# Patient Record
Sex: MALE | Race: WHITE | Employment: OTHER | URBAN - METROPOLITAN AREA
[De-identification: names, ages, dates, MRNs, and addresses within clinical notes are randomized per-mention and may not be internally consistent; named-entity substitution may affect disease eponyms.]

---

## 2017-07-20 ENCOUNTER — OFFICE VISIT (OUTPATIENT)
Dept: FAMILY MEDICINE CLINIC | Facility: CLINIC | Age: 62
End: 2017-07-20

## 2017-07-20 VITALS
BODY MASS INDEX: 27 KG/M2 | DIASTOLIC BLOOD PRESSURE: 85 MMHG | HEART RATE: 90 BPM | SYSTOLIC BLOOD PRESSURE: 132 MMHG | WEIGHT: 195.38 LBS

## 2017-07-20 DIAGNOSIS — L30.9 ECZEMA, UNSPECIFIED TYPE: Primary | ICD-10-CM

## 2017-07-20 PROCEDURE — 99212 OFFICE O/P EST SF 10 MIN: CPT | Performed by: FAMILY MEDICINE

## 2017-07-20 PROCEDURE — 99213 OFFICE O/P EST LOW 20 MIN: CPT | Performed by: FAMILY MEDICINE

## 2017-07-20 RX ORDER — MUPIROCIN CALCIUM 20 MG/G
1 CREAM TOPICAL 2 TIMES DAILY
Qty: 15 G | Refills: 1 | Status: SHIPPED | OUTPATIENT
Start: 2017-07-20 | End: 2017-08-03

## 2017-07-20 RX ORDER — BETAMETHASONE DIPROPIONATE 0.5 MG/G
CREAM TOPICAL
Qty: 45 G | Refills: 0 | Status: SHIPPED | OUTPATIENT
Start: 2017-07-20 | End: 2017-08-28

## 2017-07-20 NOTE — PROGRESS NOTES
Tara Galindo is a 60year old male. Patient presents with:  Derm Problem    HPI:   Has dryness in  Both hands and cracking skin. Chronic issue and last year became MRSA and had abscess.  Reports using eucerin cream.     No current outpatient prescript plan.      Tasia Chapin MD  7/20/2017  10:49 AM

## 2017-07-20 NOTE — PATIENT INSTRUCTIONS
1. Eczema, unspecified type  Apply bactroban twice a day for 1 week - use non latex gloves and twice a day apply Aquaphor. Then apply betamethasone twice a day and start once a week bleach soaks.

## 2017-08-28 ENCOUNTER — OFFICE VISIT (OUTPATIENT)
Dept: FAMILY MEDICINE CLINIC | Facility: CLINIC | Age: 62
End: 2017-08-28

## 2017-08-28 VITALS
HEIGHT: 70 IN | SYSTOLIC BLOOD PRESSURE: 122 MMHG | BODY MASS INDEX: 27.2 KG/M2 | DIASTOLIC BLOOD PRESSURE: 82 MMHG | WEIGHT: 190 LBS | HEART RATE: 83 BPM

## 2017-08-28 DIAGNOSIS — F17.200 TOBACCO USE DISORDER: ICD-10-CM

## 2017-08-28 DIAGNOSIS — Z00.00 ROUTINE GENERAL MEDICAL EXAMINATION AT A HEALTH CARE FACILITY: Primary | ICD-10-CM

## 2017-08-28 DIAGNOSIS — M54.50 CHRONIC BILATERAL LOW BACK PAIN WITHOUT SCIATICA: ICD-10-CM

## 2017-08-28 DIAGNOSIS — L25.9 CONTACT DERMATITIS AND ECZEMA: ICD-10-CM

## 2017-08-28 DIAGNOSIS — Z85.038 HISTORY OF COLON CANCER: ICD-10-CM

## 2017-08-28 DIAGNOSIS — G89.29 CHRONIC BILATERAL LOW BACK PAIN WITHOUT SCIATICA: ICD-10-CM

## 2017-08-28 PROBLEM — Z87.01 HISTORY OF PNEUMONIA: Status: ACTIVE | Noted: 2017-08-28

## 2017-08-28 PROCEDURE — 99396 PREV VISIT EST AGE 40-64: CPT | Performed by: PHYSICIAN ASSISTANT

## 2017-08-28 RX ORDER — NICOTINE 21 MG/24HR
1 PATCH, TRANSDERMAL 24 HOURS TRANSDERMAL EVERY 24 HOURS
Qty: 21 PATCH | Refills: 2 | Status: SHIPPED | OUTPATIENT
Start: 2017-08-28 | End: 2018-06-27

## 2017-08-28 RX ORDER — BETAMETHASONE DIPROPIONATE 0.5 MG/G
CREAM TOPICAL
Qty: 45 G | Refills: 0 | Status: SHIPPED | OUTPATIENT
Start: 2017-08-28 | End: 2019-07-30

## 2017-08-28 NOTE — PROGRESS NOTES
HPI:   Lena Rahman is a 60year old male who presents for physical exam.  He has history of colon cancer diagnosed 1998 and has been getting surveillance colonoscopy every 3 years. He has history of pneumonia and COPD. He is chronic smoker.   He de Last attempt to quit: 8/31/2015    Smokeless tobacco: Never Used                        Comment: E-cig rarely    Alcohol use: Yes           6.0 oz/week       Cans of beer: 12 per week       Comment: weekly    Drug use: No            Other Topics groomed. Physical Exam:  GEN:  Patient is alert, awake and oriented, well developed, well nourished, no apparent distress. HEENT:  Head:  Normocephalic, atraumatic.  Eyes: EOMI, PERRLA, no scleral icterus, conjunctivae clear bilaterally, no eye discharg

## 2017-08-29 ENCOUNTER — LAB ENCOUNTER (OUTPATIENT)
Dept: LAB | Facility: HOSPITAL | Age: 62
End: 2017-08-29
Attending: PHYSICIAN ASSISTANT
Payer: COMMERCIAL

## 2017-08-29 ENCOUNTER — HOSPITAL ENCOUNTER (OUTPATIENT)
Dept: GENERAL RADIOLOGY | Facility: HOSPITAL | Age: 62
Discharge: HOME | End: 2017-08-29
Attending: PHYSICIAN ASSISTANT
Payer: COMMERCIAL

## 2017-08-29 DIAGNOSIS — Z00.00 ROUTINE GENERAL MEDICAL EXAMINATION AT A HEALTH CARE FACILITY: ICD-10-CM

## 2017-08-29 DIAGNOSIS — M54.50 CHRONIC BILATERAL LOW BACK PAIN WITHOUT SCIATICA: ICD-10-CM

## 2017-08-29 DIAGNOSIS — G89.29 CHRONIC BILATERAL LOW BACK PAIN WITHOUT SCIATICA: ICD-10-CM

## 2017-08-29 LAB
ALBUMIN SERPL BCP-MCNC: 4.2 G/DL (ref 3.5–4.8)
ALBUMIN/GLOB SERPL: 1.5 {RATIO} (ref 1–2)
ALP SERPL-CCNC: 62 U/L (ref 32–100)
ALT SERPL-CCNC: 19 U/L (ref 17–63)
ANION GAP SERPL CALC-SCNC: 7 MMOL/L (ref 0–18)
AST SERPL-CCNC: 29 U/L (ref 15–41)
BASOPHILS # BLD: 0 K/UL (ref 0–0.2)
BASOPHILS NFR BLD: 1 %
BILIRUB SERPL-MCNC: 0.9 MG/DL (ref 0.3–1.2)
BUN SERPL-MCNC: 13 MG/DL (ref 8–20)
BUN/CREAT SERPL: 12.7 (ref 10–20)
CALCIUM SERPL-MCNC: 8.6 MG/DL (ref 8.5–10.5)
CHLORIDE SERPL-SCNC: 105 MMOL/L (ref 95–110)
CHOLEST SERPL-MCNC: 190 MG/DL (ref 110–200)
CO2 SERPL-SCNC: 24 MMOL/L (ref 22–32)
CREAT SERPL-MCNC: 1.02 MG/DL (ref 0.5–1.5)
EOSINOPHIL # BLD: 0.2 K/UL (ref 0–0.7)
EOSINOPHIL NFR BLD: 2 %
ERYTHROCYTE [DISTWIDTH] IN BLOOD BY AUTOMATED COUNT: 12.8 % (ref 11–15)
GLOBULIN PLAS-MCNC: 2.8 G/DL (ref 2.5–3.7)
GLUCOSE SERPL-MCNC: 105 MG/DL (ref 70–99)
HCT VFR BLD AUTO: 45.7 % (ref 41–52)
HDLC SERPL-MCNC: 49 MG/DL
HGB BLD-MCNC: 15.8 G/DL (ref 13.5–17.5)
LDLC SERPL CALC-MCNC: 120 MG/DL (ref 0–99)
LYMPHOCYTES # BLD: 1.8 K/UL (ref 1–4)
LYMPHOCYTES NFR BLD: 25 %
MCH RBC QN AUTO: 32.7 PG (ref 27–32)
MCHC RBC AUTO-ENTMCNC: 34.6 G/DL (ref 32–37)
MCV RBC AUTO: 94.6 FL (ref 80–100)
MONOCYTES # BLD: 0.7 K/UL (ref 0–1)
MONOCYTES NFR BLD: 10 %
NEUTROPHILS # BLD AUTO: 4.7 K/UL (ref 1.8–7.7)
NEUTROPHILS NFR BLD: 63 %
NONHDLC SERPL-MCNC: 141 MG/DL
OSMOLALITY UR CALC.SUM OF ELEC: 282 MOSM/KG (ref 275–295)
PLATELET # BLD AUTO: 192 K/UL (ref 140–400)
PMV BLD AUTO: 8.9 FL (ref 7.4–10.3)
POTASSIUM SERPL-SCNC: 4.2 MMOL/L (ref 3.3–5.1)
PROT SERPL-MCNC: 7 G/DL (ref 5.9–8.4)
PSA SERPL-MCNC: 0.9 NG/ML (ref 0–4)
RBC # BLD AUTO: 4.83 M/UL (ref 4.5–5.9)
SODIUM SERPL-SCNC: 136 MMOL/L (ref 136–144)
TRIGL SERPL-MCNC: 103 MG/DL (ref 1–149)
TSH SERPL-ACNC: 2.46 UIU/ML (ref 0.45–5.33)
WBC # BLD AUTO: 7.4 K/UL (ref 4–11)

## 2017-08-29 PROCEDURE — 85025 COMPLETE CBC W/AUTO DIFF WBC: CPT

## 2017-08-29 PROCEDURE — 80061 LIPID PANEL: CPT

## 2017-08-29 PROCEDURE — 84443 ASSAY THYROID STIM HORMONE: CPT

## 2017-08-29 PROCEDURE — 36415 COLL VENOUS BLD VENIPUNCTURE: CPT

## 2017-08-29 PROCEDURE — 72100 X-RAY EXAM L-S SPINE 2/3 VWS: CPT | Performed by: PHYSICIAN ASSISTANT

## 2017-08-29 PROCEDURE — 80053 COMPREHEN METABOLIC PANEL: CPT

## 2017-09-08 ENCOUNTER — TELEPHONE (OUTPATIENT)
Dept: GASTROENTEROLOGY | Facility: CLINIC | Age: 62
End: 2017-09-08

## 2017-09-08 DIAGNOSIS — Z86.010 HX OF COLONIC POLYPS: Primary | ICD-10-CM

## 2017-09-08 DIAGNOSIS — Z80.0 FAMILY HX OF COLON CANCER: ICD-10-CM

## 2017-09-08 RX ORDER — IBUPROFEN 200 MG
200 TABLET ORAL EVERY 6 HOURS PRN
COMMUNITY
End: 2019-07-30

## 2017-09-08 NOTE — TELEPHONE ENCOUNTER
Last Procedure:  CLN 12/18/2012  Last Diagnosis: POSTOPERATIVE DIAGNOSES:  1. Diminutive descending colon polyp. 2. Minimal colonic diverticulosis. 3. Normal ileocolonic anastomosis. Pt has hx of colon cancer and strong family history.   Recalled for (y

## 2017-09-08 NOTE — TELEPHONE ENCOUNTER
Pt calling to schedule cln/procedure, pls call at:[phone],thanks. *Pt informed about the 72 hr c/b.

## 2017-09-08 NOTE — TELEPHONE ENCOUNTER
May schedule for a personal history of colon polyps and a family history of colon cancer. IV sedation. Split dose MiraLAX/Gatorade preparation.

## 2017-09-20 NOTE — TELEPHONE ENCOUNTER
Scheduled for:  Colonoscopy - [address]    Provider Name:   Gosia [address]  Date:  12/8/17  Location:  [facility]  Sedation:  IV  Time:  (pt is aware that [facility] will call with arrival time)  Prep:  Miralax/Gatorade, Prep instructions were given to pt over the phone, pt verbaliz

## 2017-12-08 ENCOUNTER — LAB REQUISITION (OUTPATIENT)
Dept: LAB | Facility: HOSPITAL | Age: 62
End: 2017-12-08
Payer: COMMERCIAL

## 2017-12-08 DIAGNOSIS — Z01.89 ENCOUNTER FOR OTHER SPECIFIED SPECIAL EXAMINATIONS: ICD-10-CM

## 2017-12-08 PROCEDURE — 88305 TISSUE EXAM BY PATHOLOGIST: CPT | Performed by: INTERNAL MEDICINE

## 2017-12-13 ENCOUNTER — TELEPHONE (OUTPATIENT)
Dept: GASTROENTEROLOGY | Facility: CLINIC | Age: 62
End: 2017-12-13

## 2017-12-13 NOTE — TELEPHONE ENCOUNTER
----- Message from Carson Salvador MD sent at 12/12/2017  6:25 PM CST -----  I spoke to Raj Lanier. He is feeling well. He had #5 subcentimeter tubular adenomas removed. Minimal diverticulosis was present.   I have recommended a high-fiber diet and a follo

## 2018-06-27 ENCOUNTER — HOSPITAL ENCOUNTER (OUTPATIENT)
Age: 63
Discharge: HOME | End: 2018-06-27
Attending: EMERGENCY MEDICINE
Payer: COMMERCIAL

## 2018-06-27 VITALS
HEIGHT: 71 IN | TEMPERATURE: 98 F | RESPIRATION RATE: 20 BRPM | SYSTOLIC BLOOD PRESSURE: 135 MMHG | BODY MASS INDEX: 26.6 KG/M2 | WEIGHT: 190 LBS | DIASTOLIC BLOOD PRESSURE: 89 MMHG

## 2018-06-27 DIAGNOSIS — L30.9 ECZEMA, UNSPECIFIED TYPE: Primary | ICD-10-CM

## 2018-06-27 PROCEDURE — 99213 OFFICE O/P EST LOW 20 MIN: CPT

## 2018-06-27 PROCEDURE — 99214 OFFICE O/P EST MOD 30 MIN: CPT

## 2018-06-27 RX ORDER — SULFAMETHOXAZOLE AND TRIMETHOPRIM 800; 160 MG/1; MG/1
1 TABLET ORAL 2 TIMES DAILY
Qty: 14 TABLET | Refills: 0 | Status: SHIPPED | OUTPATIENT
Start: 2018-06-27 | End: 2018-07-04

## 2018-06-27 NOTE — ED PROVIDER NOTES
Patient Seen in: [facility]mediate Care In [address] [place] [address]    History   Patient presents with:  Rash Skin Problem (integumentary)    Stated Complaint: rash    HPI    Patient is a 69-year-old male that has an eczematous dry skin and itchy rash to his r (36.9 °C) (Oral)   Resp 20   Ht 180.3 cm (5' 11\")   Wt 86.2 kg   BMI 26.50 kg/m²         Physical Exam    Patient is afebrile and in no distress focused exam in both his hands    Patient is an eczematous pruritic rash on the volar surface of his right wri

## 2018-06-29 ENCOUNTER — OFFICE VISIT (OUTPATIENT)
Dept: FAMILY MEDICINE CLINIC | Facility: CLINIC | Age: 63
End: 2018-06-29

## 2018-06-29 VITALS
BODY MASS INDEX: 28.35 KG/M2 | HEART RATE: 69 BPM | HEIGHT: 70 IN | DIASTOLIC BLOOD PRESSURE: 83 MMHG | TEMPERATURE: 98 F | SYSTOLIC BLOOD PRESSURE: 127 MMHG | WEIGHT: 198 LBS

## 2018-06-29 DIAGNOSIS — L30.9 ECZEMA OF BOTH HANDS: Primary | ICD-10-CM

## 2018-06-29 DIAGNOSIS — M79.89 BILATERAL HAND SWELLING: ICD-10-CM

## 2018-06-29 PROCEDURE — 99212 OFFICE O/P EST SF 10 MIN: CPT | Performed by: FAMILY MEDICINE

## 2018-06-29 PROCEDURE — 1111F DSCHRG MED/CURRENT MED MERGE: CPT | Performed by: FAMILY MEDICINE

## 2018-06-29 PROCEDURE — 99214 OFFICE O/P EST MOD 30 MIN: CPT | Performed by: FAMILY MEDICINE

## 2018-06-29 RX ORDER — PREDNISONE 20 MG/1
TABLET ORAL
Qty: 10 TABLET | Refills: 0 | Status: SHIPPED | OUTPATIENT
Start: 2018-06-29 | End: 2019-07-30

## 2018-06-29 NOTE — PROGRESS NOTES
Patient ID: Amy Mirza is a 58year old male.     HPI  Patient presents with:  Hospital F/U: UC - Rash on bilateral hands    ED Provider Notes  Date of Service: 6/27/2018 1:14 PM  Nichole Norwood MD   Emergency Medicine      []Manual[]Template  []Copied 06/29/18 : 127/83  06/27/18 : 135/89  08/28/17 : 122/82  07/20/17 : 132/85  07/15/16 : 124/79  07/05/16 : 137/79        Review of Systems      Past Medical History:   Diagnosis Date   • Cancer [facility])     colon cancer   • Carcinoma in situ of colon    • Hype -     predniSONE 20 MG Oral Tab; Take 2 by mouth at same time daily for 5 days.  (Best taken at [facility] or [facility])  Definitely getting better and will continue the medications per the hospital but also start prednisone for 5 days for quicker relief and res

## 2018-07-06 ENCOUNTER — TELEPHONE (OUTPATIENT)
Dept: OTHER | Age: 63
End: 2018-07-06

## 2018-07-06 ENCOUNTER — HOSPITAL ENCOUNTER (OUTPATIENT)
Age: 63
Discharge: HOME | End: 2018-07-06
Attending: EMERGENCY MEDICINE
Payer: COMMERCIAL

## 2018-07-06 VITALS
OXYGEN SATURATION: 97 % | RESPIRATION RATE: 18 BRPM | HEART RATE: 82 BPM | TEMPERATURE: 98 F | DIASTOLIC BLOOD PRESSURE: 92 MMHG | BODY MASS INDEX: 28 KG/M2 | WEIGHT: 198 LBS | SYSTOLIC BLOOD PRESSURE: 143 MMHG

## 2018-07-06 DIAGNOSIS — L20.82 FLEXURAL ECZEMA: Primary | ICD-10-CM

## 2018-07-06 PROCEDURE — 99213 OFFICE O/P EST LOW 20 MIN: CPT

## 2018-07-06 PROCEDURE — 99214 OFFICE O/P EST MOD 30 MIN: CPT

## 2018-07-06 RX ORDER — CEPHALEXIN 500 MG/1
500 CAPSULE ORAL 4 TIMES DAILY
Qty: 28 CAPSULE | Refills: 0 | Status: SHIPPED | OUTPATIENT
Start: 2018-07-06 | End: 2018-07-13

## 2018-07-06 NOTE — TELEPHONE ENCOUNTER
Pt seen for dermatitis issue on hands bilateral.  Completed medication therapy but not completely resolved stills has redness, blisters and pus with pain. Concerned may turn into MRSA as has H/O it.   Wanted appt for eval today, however no access w/ any pr

## 2018-07-06 NOTE — ED INITIAL ASSESSMENT (HPI)
10 days ago he was seen for weeping rash on both hands and prescribed bactrim and a cream.  Saw PMD 2 days later. Has been using cotton gloves in addition to creams and hands look much better but still has open areas between some fingers.

## 2018-07-06 NOTE — ED PROVIDER NOTES
Patient Seen in: [facility] Immediate Care In [address] [place] [address]    History   Patient presents with:  Rash Skin Problem (integumentary)    Stated Complaint: recheck fingers    HPI  Patient is a 39-year-old male who presents to immediate care with a rash on Musculoskeletal: Negative. Skin: Positive for rash. Neurological: Negative. Positive for stated complaint: recheck fingers  Other systems are as noted in HPI. Constitutional and vital signs reviewed.       All other systems reviewed and negat List    START taking these medications    cephALEXin (KEFLEX) 500 MG Oral Cap  Take 1 capsule (500 mg total) by mouth 4 (four) times daily.   Qty: 28 capsule Refills: 0

## 2019-07-30 ENCOUNTER — HOSPITAL ENCOUNTER (OUTPATIENT)
Age: 64
Discharge: HOME | End: 2019-07-30
Attending: EMERGENCY MEDICINE
Payer: MEDICARE

## 2019-07-30 VITALS
OXYGEN SATURATION: 98 % | HEART RATE: 83 BPM | SYSTOLIC BLOOD PRESSURE: 138 MMHG | TEMPERATURE: 98 F | BODY MASS INDEX: 26.6 KG/M2 | HEIGHT: 71 IN | WEIGHT: 190 LBS | RESPIRATION RATE: 16 BRPM | DIASTOLIC BLOOD PRESSURE: 77 MMHG

## 2019-07-30 DIAGNOSIS — L03.019 CELLULITIS OF FINGER, UNSPECIFIED LATERALITY: ICD-10-CM

## 2019-07-30 DIAGNOSIS — L20.82 FLEXURAL ECZEMA: Primary | ICD-10-CM

## 2019-07-30 PROCEDURE — 99214 OFFICE O/P EST MOD 30 MIN: CPT

## 2019-07-30 PROCEDURE — 99213 OFFICE O/P EST LOW 20 MIN: CPT

## 2019-07-30 RX ORDER — CLINDAMYCIN HYDROCHLORIDE 300 MG/1
300 CAPSULE ORAL 3 TIMES DAILY
Qty: 30 CAPSULE | Refills: 0 | Status: SHIPPED | OUTPATIENT
Start: 2019-07-30 | End: 2019-08-09

## 2019-07-30 NOTE — ED INITIAL ASSESSMENT (HPI)
Pt complaining of itchiness in both hands. +discharge to hands since he is scratching them.   Hx of mrsa

## 2019-07-30 NOTE — ED PROVIDER NOTES
Patient presents with:  Itchiness    Stated Complaint: Rash    HPI  Patient complains of skin rash for  B/L rash on hands, flexural,  days. Located in between fingers, h/x of exczema and cellulitis.   Describes as red warm and tender, in right hand area, negative except as noted above. PSFH elements reviewed from today and agreed except as otherwise stated in HPI.     Physical Exam     ED Triage Vitals [07/30/19 1458]   /77   Pulse 83   Resp 16   Temp 97.8 °F (36.6 °C)   Temp src Oral   SpO2 98 %

## 2019-07-31 ENCOUNTER — TELEPHONE (OUTPATIENT)
Dept: OTHER | Age: 64
End: 2019-07-31

## 2019-07-31 ENCOUNTER — OFFICE VISIT (OUTPATIENT)
Dept: FAMILY MEDICINE CLINIC | Facility: CLINIC | Age: 64
End: 2019-07-31
Payer: MEDICARE

## 2019-07-31 ENCOUNTER — TELEPHONE (OUTPATIENT)
Dept: FAMILY MEDICINE CLINIC | Facility: CLINIC | Age: 64
End: 2019-07-31

## 2019-07-31 VITALS
HEART RATE: 66 BPM | SYSTOLIC BLOOD PRESSURE: 126 MMHG | BODY MASS INDEX: 28.63 KG/M2 | TEMPERATURE: 97 F | DIASTOLIC BLOOD PRESSURE: 82 MMHG | HEIGHT: 70 IN | WEIGHT: 200 LBS

## 2019-07-31 DIAGNOSIS — L03.119 CELLULITIS AND ABSCESS OF HAND: Primary | ICD-10-CM

## 2019-07-31 DIAGNOSIS — L30.9 ECZEMA OF BOTH HANDS: ICD-10-CM

## 2019-07-31 DIAGNOSIS — L02.519 CELLULITIS AND ABSCESS OF HAND: Primary | ICD-10-CM

## 2019-07-31 PROCEDURE — 96372 THER/PROPH/DIAG INJ SC/IM: CPT | Performed by: NURSE PRACTITIONER

## 2019-07-31 PROCEDURE — G0463 HOSPITAL OUTPT CLINIC VISIT: HCPCS | Performed by: NURSE PRACTITIONER

## 2019-07-31 PROCEDURE — 99213 OFFICE O/P EST LOW 20 MIN: CPT | Performed by: NURSE PRACTITIONER

## 2019-07-31 RX ORDER — CEFTRIAXONE 1 G/1
1000 INJECTION, POWDER, FOR SOLUTION INTRAMUSCULAR; INTRAVENOUS ONCE
Status: COMPLETED | OUTPATIENT
Start: 2019-07-31 | End: 2019-07-31

## 2019-07-31 RX ADMIN — CEFTRIAXONE 1000 MG: 1 INJECTION, POWDER, FOR SOLUTION INTRAMUSCULAR; INTRAVENOUS at 11:59:00

## 2019-07-31 NOTE — PROGRESS NOTES
HPI    Patient presents for urgent care follow up. Was seen yesterday and diagnosed with eczema/cellulitus. Was put on clindamycin to take tid x 10 days as well as triamcinolone cream to use bid. Feels like today is much worse. Swollen and oozing.   Has Worry: Not on file        Inability: Not on file      Transportation needs:        Medical: Not on file        Non-medical: Not on file    Tobacco Use      Smoking status: Current Every Day Smoker        Packs/day: 1.00        Years: 40.00        Pac Clindamycin HCl 300 MG Oral Cap Take 1 capsule (300 mg total) by mouth 3 (three) times daily for 10 days. Disp: 30 capsule Rfl: 0   triamcinolone acetonide 0.1 % External Ointment Apply 1 Application topically 2 (two) times daily.  Disp: 1 Tube Rfl: 0

## 2019-07-31 NOTE — PATIENT INSTRUCTIONS
Discharge Instructions for Cellulitis  You have been diagnosed with cellulitis. This is an infection in the deepest layer of the skin. In some cases, the infection also affects the muscle. Cellulitis is caused by bacteria.  The bacteria can enter the body Date Last Reviewed: 8/1/2016  © 1183-3020 [employer] [address]. [address] [facility], [address] [place]. All rights reserved. This information is not intended as a substitute for professional medical care.  Always follow your healthcare professional'

## 2019-07-31 NOTE — TELEPHONE ENCOUNTER
Pt states he was seen in UC yesterday, diagnosed with eczema of his right hand; also has some areas on left hand. Pt states today his hand is swollen and draining clear to pink drainage. Pt was given prescription for Clindamycin and Triamcinolone oint.  Pt

## 2019-07-31 NOTE — TELEPHONE ENCOUNTER
Pt wife stts that the Dermatology that ([facility]) referral them to Edi Walter  is booked out for the next two month 1st opening is in September, Pt wife wants to know if Dr can contact her office to get him in faster to be looked at.       Please advise

## 2019-08-05 ENCOUNTER — OFFICE VISIT (OUTPATIENT)
Dept: DERMATOLOGY CLINIC | Facility: CLINIC | Age: 64
End: 2019-08-05
Payer: MEDICARE

## 2019-08-05 DIAGNOSIS — L03.119 CELLULITIS AND ABSCESS OF HAND: ICD-10-CM

## 2019-08-05 DIAGNOSIS — L25.9 CONTACT DERMATITIS AND ECZEMA: Primary | ICD-10-CM

## 2019-08-05 DIAGNOSIS — L02.519 CELLULITIS AND ABSCESS OF HAND: ICD-10-CM

## 2019-08-05 PROCEDURE — 99202 OFFICE O/P NEW SF 15 MIN: CPT | Performed by: DERMATOLOGY

## 2019-08-05 PROCEDURE — G0463 HOSPITAL OUTPT CLINIC VISIT: HCPCS | Performed by: DERMATOLOGY

## 2019-08-05 RX ORDER — PREDNISONE 10 MG/1
TABLET ORAL
Qty: 30 TABLET | Refills: 0 | Status: SHIPPED | OUTPATIENT
Start: 2019-08-05 | End: 2019-08-17

## 2019-08-05 RX ORDER — FLUCONAZOLE 100 MG/1
100 TABLET ORAL DAILY
Qty: 5 TABLET | Refills: 0 | Status: SHIPPED | OUTPATIENT
Start: 2019-08-05 | End: 2020-08-25

## 2019-08-07 ENCOUNTER — OFFICE VISIT (OUTPATIENT)
Dept: FAMILY MEDICINE CLINIC | Facility: CLINIC | Age: 64
End: 2019-08-07
Payer: MEDICARE

## 2019-08-07 VITALS
HEART RATE: 71 BPM | RESPIRATION RATE: 20 BRPM | BODY MASS INDEX: 27.44 KG/M2 | TEMPERATURE: 98 F | HEIGHT: 71 IN | DIASTOLIC BLOOD PRESSURE: 88 MMHG | SYSTOLIC BLOOD PRESSURE: 137 MMHG | WEIGHT: 196 LBS

## 2019-08-07 DIAGNOSIS — L03.119 CELLULITIS AND ABSCESS OF HAND: Primary | ICD-10-CM

## 2019-08-07 DIAGNOSIS — L02.519 CELLULITIS AND ABSCESS OF HAND: Primary | ICD-10-CM

## 2019-08-07 PROCEDURE — G0463 HOSPITAL OUTPT CLINIC VISIT: HCPCS | Performed by: NURSE PRACTITIONER

## 2019-08-07 PROCEDURE — 99213 OFFICE O/P EST LOW 20 MIN: CPT | Performed by: NURSE PRACTITIONER

## 2019-08-07 NOTE — PROGRESS NOTES
HPI    Patient presents for one week follow up. Was seen in the IC last week on 7/30 for cellulitis of the hand and given po clindamycin and topical triamcinolone. Followed up in the office 7/31 and reported that it was getting more swollen and painful. Number of children: Not on file      Years of education: Not on file      Highest education level: Not on file    Occupational History      Not on file    Social Needs      Financial resource strain: Not on file      Food insecurity:        Worry: Not Bike Helmet: Not Asked        Seat Belt: Not Asked        Self-Exams: Not Asked        Grew up on a farm: Not Asked        History of tanning: Not Asked        Outdoor occupation: Not Asked        Reaction to local anesthetic: No    Social History Na Discussed plan of care with patient and patient is in agreement. All questions answered. Patient to call with questions or concerns. Encouraged to sign up for My Chart if not already registered.

## 2019-08-07 NOTE — PATIENT INSTRUCTIONS
Discharge Instructions for Cellulitis  You have been diagnosed with cellulitis. This is an infection in the deepest layer of the skin and tissue beneath the skin. In some cases, the infection also affects the muscle. Cellulitis is caused by bacteria.  The · Vomiting  Date Last Reviewed: 8/1/2016  © 4582-0559 [employer]. [address] [facility], [facility] [place]. All rights reserved. This information is not intended as a substitute for professional medical care.  Always follow your healthcare p

## 2019-08-18 NOTE — PROGRESS NOTES
Suyapa Serrano is a 71year old male. Patient presents with:  Derm Problem: New pt. pt presenting today with Abscess to R hand for a week. c/o itching and swelling.  pt went to urgent care and was prescribed  Clindamycin 300 mg and triamcinolone cr situ of colon    • Hypercholesterolemia    • Measles    • MRSA (methicillin resistant Staphylococcus aureus) 12.2009    Per [employer] is Jt Brown, double check with patient   • Mumps    • Varicella zoster      Past Surgical History:   Procedure Laterality Date Not on file        Forced sexual activity: Not on file    Other Topics      Concerns:         Service: Not Asked        Blood Transfusions: Not Asked        Caffeine Concern: Yes          6 cups coffee daily        Occupational Exposure: Not Asked else at home itching. No recent illnesses. ROS:   Denies any other systemic complaints. No fevers, chills, night sweats, photosensitivity, lymph node swelling. No other skin complaints.       Physical examination:  Well-developed well-nourished pat needed. RTC as noted 1 week if needed    The patient indicates understanding of these issues and agrees to the plan. The patient is asked to return as noted in follow-up/ above. This note was generated using Dragon voice recognition software.   Obie

## 2020-03-16 ENCOUNTER — APPOINTMENT (OUTPATIENT)
Dept: LAB | Age: 65
End: 2020-03-16
Attending: FAMILY MEDICINE
Payer: MEDICARE

## 2020-03-16 ENCOUNTER — HOSPITAL ENCOUNTER (OUTPATIENT)
Dept: GENERAL RADIOLOGY | Age: 65
Discharge: HOME | End: 2020-03-16
Attending: FAMILY MEDICINE
Payer: MEDICARE

## 2020-03-16 ENCOUNTER — OFFICE VISIT (OUTPATIENT)
Dept: FAMILY MEDICINE CLINIC | Facility: CLINIC | Age: 65
End: 2020-03-16
Payer: MEDICARE

## 2020-03-16 VITALS
SYSTOLIC BLOOD PRESSURE: 110 MMHG | HEART RATE: 88 BPM | TEMPERATURE: 97 F | WEIGHT: 210.63 LBS | BODY MASS INDEX: 29.49 KG/M2 | DIASTOLIC BLOOD PRESSURE: 70 MMHG | HEIGHT: 71 IN

## 2020-03-16 DIAGNOSIS — J43.2 CENTRILOBULAR EMPHYSEMA (HCC): ICD-10-CM

## 2020-03-16 DIAGNOSIS — J43.9 PULMONARY EMPHYSEMA, UNSPECIFIED EMPHYSEMA TYPE (HCC): ICD-10-CM

## 2020-03-16 DIAGNOSIS — J98.4 PULMONARY SCARRING: ICD-10-CM

## 2020-03-16 DIAGNOSIS — Z00.00 ADULT GENERAL MEDICAL EXAM: Primary | ICD-10-CM

## 2020-03-16 DIAGNOSIS — Z11.4 ENCOUNTER FOR SCREENING FOR HIV: ICD-10-CM

## 2020-03-16 DIAGNOSIS — Z00.00 ENCOUNTER FOR ANNUAL HEALTH EXAMINATION: ICD-10-CM

## 2020-03-16 DIAGNOSIS — F17.200 TOBACCO USE DISORDER: ICD-10-CM

## 2020-03-16 DIAGNOSIS — F17.210 CIGARETTE SMOKER: ICD-10-CM

## 2020-03-16 DIAGNOSIS — Z23 FLU VACCINE NEED: ICD-10-CM

## 2020-03-16 DIAGNOSIS — L30.9 ECZEMA OF BOTH HANDS: ICD-10-CM

## 2020-03-16 DIAGNOSIS — Z85.038 HISTORY OF COLON CANCER: ICD-10-CM

## 2020-03-16 DIAGNOSIS — Z11.59 NEED FOR HEPATITIS C SCREENING TEST: ICD-10-CM

## 2020-03-16 DIAGNOSIS — Z23 NEED FOR VACCINATION: ICD-10-CM

## 2020-03-16 PROCEDURE — 90670 PCV13 VACCINE IM: CPT | Performed by: FAMILY MEDICINE

## 2020-03-16 PROCEDURE — G0008 ADMIN INFLUENZA VIRUS VAC: HCPCS | Performed by: FAMILY MEDICINE

## 2020-03-16 PROCEDURE — 99406 BEHAV CHNG SMOKING 3-10 MIN: CPT | Performed by: FAMILY MEDICINE

## 2020-03-16 PROCEDURE — 71046 X-RAY EXAM CHEST 2 VIEWS: CPT | Performed by: FAMILY MEDICINE

## 2020-03-16 PROCEDURE — G0438 PPPS, INITIAL VISIT: HCPCS | Performed by: FAMILY MEDICINE

## 2020-03-16 PROCEDURE — G0009 ADMIN PNEUMOCOCCAL VACCINE: HCPCS | Performed by: FAMILY MEDICINE

## 2020-03-16 PROCEDURE — 99397 PER PM REEVAL EST PAT 65+ YR: CPT | Performed by: FAMILY MEDICINE

## 2020-03-16 PROCEDURE — 93005 ELECTROCARDIOGRAM TRACING: CPT

## 2020-03-16 PROCEDURE — 96160 PT-FOCUSED HLTH RISK ASSMT: CPT | Performed by: FAMILY MEDICINE

## 2020-03-16 PROCEDURE — 90662 IIV NO PRSV INCREASED AG IM: CPT | Performed by: FAMILY MEDICINE

## 2020-03-16 PROCEDURE — 93010 ELECTROCARDIOGRAM REPORT: CPT | Performed by: FAMILY MEDICINE

## 2020-03-16 NOTE — PROGRESS NOTES
Tobacco Cessation Documentation (Smoking and Smokeless included): I had an in depth therapy session with Marvin Flynn about his tobacco use risks and options using the USPSTF's Five A's approach:    Ask: Merari Espinal is using tobacco products.   Assess:

## 2020-03-16 NOTE — PROGRESS NOTES
HPI:   Lena Rahman is a 76year old male who presents for a Medicare Initial Annual Wellness visit (Once after 12 month Medicare anniversary) . This is his first Medicare physical. He is . He retired 2 years ago.  He and his wife have a carolina risk: Fall/Risk Scorin    Cognitive Assessment   He had a completely normal cognitive assessment- see flowsheet entries    Functional Ability/Status   Liliana Cedillo has some abnormal functions as listed below:  He has problems with Daily Activitie of colon cancer     Chronic bilateral low back pain without sciatica     History of pneumonia     Contact dermatitis and eczema     Cellulitis and abscess of hand     Eczema of both hands    Wt Readings from Last 3 Encounters:  03/16/20 : 210 lb 9.6 oz (95 Negative for voice change. Respiratory: Negative for cough, chest tightness, shortness of breath and wheezing. Cardiovascular: Negative for chest pain. Gastrointestinal: Negative for abdominal pain and blood in stool.    Genitourinary: Negative for I misunderstand what others are saying and make inappropriate responses:  No I avoid social activities because I cannot hear well and fear I will reply improperly:  No   Family members and friends have told me they think I may have hearing loss:   No Older PRSV Free (96078) 03/16/2020   • Pneumococcal (Prevnar 13) 03/16/2020        ASSESSMENT AND OTHER RELEVANT CHRONIC CONDITIONS:   Joe Cervantes is a 76year old male who presents for a Medicare Assessment.      PLAN SUMMARY:     Diagnoses and all Instructions       Jasmeet Edwards's SCREENING SCHEDULE   Tests on this list are recommended by your physician but may not be covered, or covered at this frequency, by your insurer.  Please check with your insurance carrier before scheduling to verify co family history    Colorectal Cancer Screening Covered up to Age 76     Colonoscopy Screen   Covered every 10 years- more often if abnormal Colonoscopy due on 12/08/2020 Update Health Maintenance if applicable    Flex Sigmoidoscopy Screen  Covered every 5 y metal- TD and TDaP Not covered by Medicare Part B) No orders found for this or any previous visit.  This may be covered with your prescription benefits, but Medicare does not cover unless Medically needed    Zoster (Not covered by Medicare Part B) No orders Sugar (FSB)Annually Glucose (mg/dL)   Date Value   08/29/2017 105 (H)     GLUCOSE (P) (mg/dL)   Date Value   10/20/2014 89          Cardiovascular Disease Screening     LDL Annually LDL Cholesterol (mg/dL)   Date Value   08/29/2017 120 (H)   10/20/2014 100 This may be covered with your pharmacy  prescription benefits      SPECIFIC DISEASE MONITORING Internal Lab or Procedure External Lab or Procedure            COPD      Spirometry Testing Annually Spirometry date:  No flowsheet data found.           By Corby Morin

## 2020-03-16 NOTE — PATIENT INSTRUCTIONS
Derian Edwards's SCREENING SCHEDULE   Tests on this list are recommended by your physician but may not be covered, or covered at this frequency, by your insurer. Please check with your insurance carrier before scheduling to verify coverage.     PREVEN Colorectal Cancer Screening Covered up to Age 76     Colonoscopy Screen   Covered every 10 years- more often if abnormal Colonoscopy due on 12/08/2020 Update Health Maintenance if applicable    Flex Sigmoidoscopy Screen  Covered every 5 years No results Not covered by Medicare Part B) No orders found for this or any previous visit.  This may be covered with your prescription benefits, but Medicare does not cover unless Medically needed    Zoster (Not covered by Medicare Part B) No orders found for this or

## 2020-03-18 LAB
ABSOLUTE BASOPHILS: 49 CELLS/UL (ref 0–200)
ABSOLUTE EOSINOPHILS: 170 CELLS/UL (ref 15–500)
ABSOLUTE LYMPHOCYTES: 1434 CELLS/UL (ref 850–3900)
ABSOLUTE MONOCYTES: 705 CELLS/UL (ref 200–950)
ABSOLUTE NEUTROPHILS: 5743 CELLS/UL (ref 1500–7800)
ALBUMIN/GLOBULIN RATIO: 1.9 (CALC) (ref 1–2.5)
ALBUMIN: 4.5 G/DL (ref 3.6–5.1)
ALKALINE PHOSPHATASE: 70 U/L (ref 35–144)
ALT: 26 U/L (ref 9–46)
AST: 23 U/L (ref 10–35)
BASOPHILS: 0.6 %
BILIRUBIN, TOTAL: 0.9 MG/DL (ref 0.2–1.2)
BUN: 10 MG/DL (ref 7–25)
CALCIUM: 9.4 MG/DL (ref 8.6–10.3)
CARBON DIOXIDE: 23 MMOL/L (ref 20–32)
CHLORIDE: 106 MMOL/L (ref 98–110)
CHOL/HDLC RATIO: 3.9 (CALC)
CHOLESTEROL, TOTAL: 197 MG/DL
CREATININE: 0.99 MG/DL (ref 0.7–1.25)
EGFR IF AFRICN AM: 92 ML/MIN/1.73M2
EGFR IF NONAFRICN AM: 79 ML/MIN/1.73M2
EOSINOPHILS: 2.1 %
GLOBULIN: 2.4 G/DL (CALC) (ref 1.9–3.7)
GLUCOSE: 91 MG/DL (ref 65–99)
HDL CHOLESTEROL: 51 MG/DL
HEMATOCRIT: 44.8 % (ref 38.5–50)
HEMOGLOBIN: 15.7 G/DL (ref 13.2–17.1)
LDL-CHOLESTEROL: 120 MG/DL (CALC)
LYMPHOCYTES: 17.7 %
MCH: 32.8 PG (ref 27–33)
MCHC: 35 G/DL (ref 32–36)
MCV: 93.5 FL (ref 80–100)
MONOCYTES: 8.7 %
MPV: 10.7 FL (ref 7.5–12.5)
NEUTROPHILS: 70.9 %
NON-HDL CHOLESTEROL: 146 MG/DL (CALC)
PLATELET COUNT: 211 THOUSAND/UL (ref 140–400)
POTASSIUM: 4.2 MMOL/L (ref 3.5–5.3)
PROTEIN, TOTAL: 6.9 G/DL (ref 6.1–8.1)
PSA, TOTAL: 1.1 NG/ML
RDW: 12.1 % (ref 11–15)
RED BLOOD CELL COUNT: 4.79 MILLION/UL (ref 4.2–5.8)
SIGNAL TO CUT-OFF: 0.01
SODIUM: 140 MMOL/L (ref 135–146)
TRIGLYCERIDES: 150 MG/DL
TSH: 1.58 MIU/L (ref 0.4–4.5)
WHITE BLOOD CELL COUNT: 8.1 THOUSAND/UL (ref 3.8–10.8)

## 2020-05-20 ENCOUNTER — TELEPHONE (OUTPATIENT)
Dept: PULMONOLOGY | Facility: CLINIC | Age: 65
End: 2020-05-20

## 2020-05-20 NOTE — TELEPHONE ENCOUNTER
Pt has an appt tomorrow with Dr Kishor Montano. States he has a cough for about a month.  Please advise

## 2020-05-20 NOTE — TELEPHONE ENCOUNTER
Spoke with patient. Patient states he has an appointment tomorrow, has been having a cough for a month. Travel screening completed. Verified appointment is still scheduled for tomorrow at [facility]. Patient verbalized understanding.

## 2020-05-21 ENCOUNTER — OFFICE VISIT (OUTPATIENT)
Dept: PULMONOLOGY | Facility: CLINIC | Age: 65
End: 2020-05-21
Payer: MEDICARE

## 2020-05-21 VITALS
WEIGHT: 205 LBS | OXYGEN SATURATION: 97 % | BODY MASS INDEX: 28.7 KG/M2 | DIASTOLIC BLOOD PRESSURE: 88 MMHG | RESPIRATION RATE: 18 BRPM | HEIGHT: 71 IN | SYSTOLIC BLOOD PRESSURE: 134 MMHG | HEART RATE: 76 BPM

## 2020-05-21 DIAGNOSIS — R05.9 COUGH: ICD-10-CM

## 2020-05-21 DIAGNOSIS — Z72.0 TOBACCO ABUSE: ICD-10-CM

## 2020-05-21 DIAGNOSIS — R91.1 PULMONARY NODULE: Primary | ICD-10-CM

## 2020-05-21 DIAGNOSIS — R06.00 DOE (DYSPNEA ON EXERTION): ICD-10-CM

## 2020-05-21 PROCEDURE — 3075F SYST BP GE 130 - 139MM HG: CPT | Performed by: INTERNAL MEDICINE

## 2020-05-21 PROCEDURE — 3008F BODY MASS INDEX DOCD: CPT | Performed by: INTERNAL MEDICINE

## 2020-05-21 PROCEDURE — 99204 OFFICE O/P NEW MOD 45 MIN: CPT | Performed by: INTERNAL MEDICINE

## 2020-05-21 PROCEDURE — 3079F DIAST BP 80-89 MM HG: CPT | Performed by: INTERNAL MEDICINE

## 2020-05-21 RX ORDER — ALBUTEROL SULFATE 90 UG/1
2 AEROSOL, METERED RESPIRATORY (INHALATION) 4 TIMES DAILY PRN
Qty: 1 INHALER | Refills: 5 | Status: SHIPPED | OUTPATIENT
Start: 2020-05-21 | End: 2021-09-08

## 2020-05-21 NOTE — PROGRESS NOTES
Pulmonary Consult Note    HPI:   Jan Brenner is a 76year old male with Patient presents with:  Consult: centrilbular emphysema    Arden Mcadams DO    New insurance   F/u for IM visit and CXR done    States feeling ok  C/o arthritis- low back mostl Current Some Day Smoker        Packs/day: 0.50        Years: 40.00        Pack years: 20        Types: Cigarettes        Quit date: 2015        Years since quittin.7      Smokeless tobacco: Never Used      Tobacco comment: E-cig rarely    [employer]

## 2020-06-04 ENCOUNTER — HOSPITAL ENCOUNTER (OUTPATIENT)
Dept: GENERAL RADIOLOGY | Age: 65
Discharge: HOME | End: 2020-06-04
Attending: INTERNAL MEDICINE
Payer: MEDICARE

## 2020-06-04 DIAGNOSIS — Z72.0 TOBACCO ABUSE: ICD-10-CM

## 2020-06-04 DIAGNOSIS — R06.00 DOE (DYSPNEA ON EXERTION): ICD-10-CM

## 2020-06-04 DIAGNOSIS — R91.1 PULMONARY NODULE: ICD-10-CM

## 2020-06-04 DIAGNOSIS — R05.9 COUGH: ICD-10-CM

## 2020-06-04 PROCEDURE — 71048 X-RAY EXAM CHEST 4+ VIEWS: CPT | Performed by: INTERNAL MEDICINE

## 2020-06-19 ENCOUNTER — TELEPHONE (OUTPATIENT)
Dept: PULMONOLOGY | Facility: CLINIC | Age: 65
End: 2020-06-19

## 2020-06-22 NOTE — TELEPHONE ENCOUNTER
Patient Result Comments     Written by Teresa Walsh MD on 6/5/2020  6:56 PM   The \"nodule\" that the radiologist may have seen now looks like some scar tissue. This is good news.      raymond

## 2020-06-22 NOTE — TELEPHONE ENCOUNTER
Pt notified of below re: x-ray chest results from 6/4/20. Explained results including Dr. Nely De Jesus comments are also available via [address] He voiced understanding.

## 2020-06-25 ENCOUNTER — TELEPHONE (OUTPATIENT)
Dept: PEDIATRICS CLINIC | Facility: CLINIC | Age: 65
End: 2020-06-25

## 2020-06-26 NOTE — TELEPHONE ENCOUNTER
LMTCB - patient was supposed to follow up with Dr Ramos in 2 months, that was back in March, Please schedule follow up appointment if patient calls back ,thank you.

## 2020-07-17 ENCOUNTER — OFFICE VISIT (OUTPATIENT)
Dept: FAMILY MEDICINE CLINIC | Facility: CLINIC | Age: 65
End: 2020-07-17
Payer: MEDICARE

## 2020-07-17 VITALS
TEMPERATURE: 98 F | SYSTOLIC BLOOD PRESSURE: 145 MMHG | DIASTOLIC BLOOD PRESSURE: 90 MMHG | HEART RATE: 68 BPM | BODY MASS INDEX: 28.67 KG/M2 | HEIGHT: 71 IN | WEIGHT: 204.81 LBS

## 2020-07-17 DIAGNOSIS — L25.9 CONTACT DERMATITIS AND ECZEMA: Primary | ICD-10-CM

## 2020-07-17 PROCEDURE — 3077F SYST BP >= 140 MM HG: CPT | Performed by: NURSE PRACTITIONER

## 2020-07-17 PROCEDURE — 3080F DIAST BP >= 90 MM HG: CPT | Performed by: NURSE PRACTITIONER

## 2020-07-17 PROCEDURE — 99213 OFFICE O/P EST LOW 20 MIN: CPT | Performed by: NURSE PRACTITIONER

## 2020-07-17 PROCEDURE — 3008F BODY MASS INDEX DOCD: CPT | Performed by: NURSE PRACTITIONER

## 2020-07-17 RX ORDER — HYDROXYZINE HYDROCHLORIDE 25 MG/1
25 TABLET, FILM COATED ORAL EVERY 8 HOURS PRN
Qty: 15 TABLET | Refills: 0 | Status: SHIPPED | OUTPATIENT
Start: 2020-07-17 | End: 2021-04-02

## 2020-07-17 RX ORDER — PREDNISONE 20 MG/1
TABLET ORAL
Qty: 10 TABLET | Refills: 0 | Status: SHIPPED | OUTPATIENT
Start: 2020-07-17 | End: 2020-08-25

## 2020-07-17 RX ORDER — HYDROXYZINE HYDROCHLORIDE 25 MG/1
25 TABLET, FILM COATED ORAL EVERY 8 HOURS PRN
Qty: 15 TABLET | Refills: 0 | Status: SHIPPED | OUTPATIENT
Start: 2020-07-17 | End: 2020-07-17

## 2020-07-17 RX ORDER — PREDNISONE 20 MG/1
TABLET ORAL
Qty: 10 TABLET | Refills: 0 | Status: SHIPPED | OUTPATIENT
Start: 2020-07-17 | End: 2020-07-17

## 2020-07-17 NOTE — PROGRESS NOTES
HPI  Pt presents with bilat hand rash. Was working in garage and since Wed night started having some rash on hands and by last night itching was terrible.    Rash is present to dorsum and monet surface of bilat hands  Has h/o MRSA  Review of Systems   Con Inability: Not on file      Transportation needs:        Medical: Not on file        Non-medical: Not on file    Tobacco Use      Smoking status: Current Some Day Smoker        Packs/day: 0.50        Years: 40.00        Pack years: 20        Types: Cigaret with wife            Work [address]/            Dog/Cat at home      Current Outpatient Medications   Medication Sig Dispense Refill   • predniSONE 20 MG Oral Tab Take 2 tablets once a day for 5 days 10 tablet 0   • hydrOXYzine HCl 25 MG Oral concerns. Encouraged to sign up for My Chart if not already registered.

## 2020-07-17 NOTE — PATIENT INSTRUCTIONS
Contact Dermatitis  Contact dermatitis is a skin rash caused by something that touches the skin and makes it irritated and inflamed. Your skin may be red, swollen, dry, and may be cracked. Blisters may form and ooze. The rash will itch.    Contact dermati · You can also try wet dressings. One way to do this is to wear a wet piece of clothing under a dry one. Wear a damp shirt under a dry shirt if your upper body is affected. This can relieve itching and prevent you from scratching the affected area.   · You © 1673-8218 [employer] [address]. 140[address] [facility], [facility] [place]. All rights reserved. This information is not intended as a substitute for professional medical care. Always follow your healthcare professional's instructions.

## 2020-07-19 NOTE — ASSESSMENT & PLAN NOTE
Skin  discussed  Should wear gloves when working w caustic chemicals  Please call if symptoms worsen or are not resolving.

## 2020-08-25 ENCOUNTER — OFFICE VISIT (OUTPATIENT)
Dept: FAMILY MEDICINE CLINIC | Facility: CLINIC | Age: 65
End: 2020-08-25
Payer: MEDICARE

## 2020-08-25 VITALS
DIASTOLIC BLOOD PRESSURE: 94 MMHG | SYSTOLIC BLOOD PRESSURE: 142 MMHG | BODY MASS INDEX: 28.56 KG/M2 | HEART RATE: 73 BPM | HEIGHT: 71 IN | WEIGHT: 204 LBS

## 2020-08-25 DIAGNOSIS — L30.9 ECZEMA OF BOTH HANDS: Primary | ICD-10-CM

## 2020-08-25 DIAGNOSIS — M19.042 INFLAMMATION OF JOINT OF BOTH HANDS: ICD-10-CM

## 2020-08-25 DIAGNOSIS — M19.041 INFLAMMATION OF JOINT OF BOTH HANDS: ICD-10-CM

## 2020-08-25 PROCEDURE — 3077F SYST BP >= 140 MM HG: CPT | Performed by: NURSE PRACTITIONER

## 2020-08-25 PROCEDURE — 99214 OFFICE O/P EST MOD 30 MIN: CPT | Performed by: NURSE PRACTITIONER

## 2020-08-25 PROCEDURE — 3080F DIAST BP >= 90 MM HG: CPT | Performed by: NURSE PRACTITIONER

## 2020-08-25 PROCEDURE — 3008F BODY MASS INDEX DOCD: CPT | Performed by: NURSE PRACTITIONER

## 2020-08-25 RX ORDER — TRIAMCINOLONE ACETONIDE 5 MG/G
CREAM TOPICAL
Qty: 60 G | Refills: 2 | Status: SHIPPED | OUTPATIENT
Start: 2020-08-25 | End: 2021-04-02

## 2020-08-25 NOTE — PROGRESS NOTES
HPI  Pt here for increase in rash and itching on hands. Flared up again on Saturday. Gets little blisters on hands and then fluid comes out. S/s worse when he is \"tinkering around in his garage\".     Prednisone seemed to help a lot when he took it last mo Occupational History      Not on file    Social Needs      Financial resource strain: Not on file      Food insecurity:        Worry: Not on file        Inability: Not on file      Transportation needs:        Medical: Not on file        Non-medical: Not Grew up on a farm: Not Asked        History of tanning: Not Asked        Outdoor occupation: Not Asked        Reaction to local anesthetic: No    Social History Narrative      Live with wife            Work [address]/            Dog/Cat Discussed plan of care with pt and pt is in agreement. All questions answered. Pt to call with questions or concerns. Encouraged to sign up for My Chart if not already registered.

## 2020-08-28 LAB
C-REACTIVE PROTEIN: 3.6 MG/L
CYCLIC CITRULLINATED$PEPTIDE (CCP) AB (IGG): <16 UNITS
RHEUMATOID FACTOR: <14 IU/ML
SED RATE BY MODIFIED$WESTERGREN: 2 MM/H

## 2020-09-01 ENCOUNTER — TELEPHONE (OUTPATIENT)
Dept: FAMILY MEDICINE CLINIC | Facility: CLINIC | Age: 65
End: 2020-09-01

## 2020-09-01 NOTE — TELEPHONE ENCOUNTER
Patient following up for results, notes below reviewed with understanding. Patient reports rash has been improving with cream, he will hold off on Derm appt at this time, if symptoms worsen will make appt as recommended.      Written by Ricky Loredo,

## 2020-11-02 ENCOUNTER — TELEPHONE (OUTPATIENT)
Dept: GASTROENTEROLOGY | Facility: CLINIC | Age: 65
End: 2020-11-02

## 2020-11-02 NOTE — TELEPHONE ENCOUNTER
----- Message from Eric Reynolds RN sent at 12/13/2017  9:03 AM CST -----  Regarding: recall colon  Recall colon in 3 years per GS.  Colon done 12/8/17

## 2021-02-06 DIAGNOSIS — Z23 NEED FOR VACCINATION: ICD-10-CM

## 2021-03-22 ENCOUNTER — TELEPHONE (OUTPATIENT)
Dept: CASE MANAGEMENT | Age: 66
End: 2021-03-22

## 2021-03-22 NOTE — TELEPHONE ENCOUNTER
Patient is eligible for a 2021 Medicare Annual Wellness visit. Left message to call back [phone].

## 2021-04-02 ENCOUNTER — TELEPHONE (OUTPATIENT)
Dept: GASTROENTEROLOGY | Facility: CLINIC | Age: 66
End: 2021-04-02

## 2021-04-02 ENCOUNTER — HOSPITAL ENCOUNTER (OUTPATIENT)
Dept: GENERAL RADIOLOGY | Age: 66
Discharge: HOME | End: 2021-04-02
Attending: FAMILY MEDICINE
Payer: MEDICARE

## 2021-04-02 ENCOUNTER — OFFICE VISIT (OUTPATIENT)
Dept: FAMILY MEDICINE CLINIC | Facility: CLINIC | Age: 66
End: 2021-04-02
Payer: MEDICARE

## 2021-04-02 VITALS
TEMPERATURE: 98 F | SYSTOLIC BLOOD PRESSURE: 153 MMHG | BODY MASS INDEX: 29.49 KG/M2 | WEIGHT: 210.63 LBS | DIASTOLIC BLOOD PRESSURE: 84 MMHG | HEART RATE: 88 BPM | HEIGHT: 71 IN

## 2021-04-02 DIAGNOSIS — Z00.00 ENCOUNTER FOR ANNUAL HEALTH EXAMINATION: ICD-10-CM

## 2021-04-02 DIAGNOSIS — M47.816 ARTHRITIS, LUMBAR SPINE: ICD-10-CM

## 2021-04-02 DIAGNOSIS — M19.041 INFLAMMATION OF JOINT OF BOTH HANDS: ICD-10-CM

## 2021-04-02 DIAGNOSIS — S22.000D CLOSED COMPRESSION FRACTURE OF THORACIC VERTEBRA WITH ROUTINE HEALING, SUBSEQUENT ENCOUNTER: ICD-10-CM

## 2021-04-02 DIAGNOSIS — Z00.00 ADULT GENERAL MEDICAL EXAM: Primary | ICD-10-CM

## 2021-04-02 DIAGNOSIS — F17.210 CIGARETTE SMOKER: ICD-10-CM

## 2021-04-02 DIAGNOSIS — J43.2 CENTRILOBULAR EMPHYSEMA (HCC): ICD-10-CM

## 2021-04-02 DIAGNOSIS — M19.042 INFLAMMATION OF JOINT OF BOTH HANDS: ICD-10-CM

## 2021-04-02 DIAGNOSIS — E78.2 MIXED HYPERLIPIDEMIA: ICD-10-CM

## 2021-04-02 DIAGNOSIS — Z85.038 HISTORY OF COLON CANCER: ICD-10-CM

## 2021-04-02 DIAGNOSIS — Z86.010 PERSONAL HISTORY OF COLONIC POLYPS: Primary | ICD-10-CM

## 2021-04-02 DIAGNOSIS — I10 ESSENTIAL HYPERTENSION: ICD-10-CM

## 2021-04-02 PROCEDURE — 99397 PER PM REEVAL EST PAT 65+ YR: CPT | Performed by: FAMILY MEDICINE

## 2021-04-02 PROCEDURE — G0439 PPPS, SUBSEQ VISIT: HCPCS | Performed by: FAMILY MEDICINE

## 2021-04-02 PROCEDURE — 3079F DIAST BP 80-89 MM HG: CPT | Performed by: FAMILY MEDICINE

## 2021-04-02 PROCEDURE — 96160 PT-FOCUSED HLTH RISK ASSMT: CPT | Performed by: FAMILY MEDICINE

## 2021-04-02 PROCEDURE — 72110 X-RAY EXAM L-2 SPINE 4/>VWS: CPT | Performed by: FAMILY MEDICINE

## 2021-04-02 PROCEDURE — 3077F SYST BP >= 140 MM HG: CPT | Performed by: FAMILY MEDICINE

## 2021-04-02 PROCEDURE — 3008F BODY MASS INDEX DOCD: CPT | Performed by: FAMILY MEDICINE

## 2021-04-02 RX ORDER — ATORVASTATIN CALCIUM 20 MG/1
20 TABLET, FILM COATED ORAL NIGHTLY
Qty: 90 TABLET | Refills: 0 | Status: SHIPPED | OUTPATIENT
Start: 2021-04-02 | End: 2021-06-29

## 2021-04-02 RX ORDER — LISINOPRIL 20 MG/1
20 TABLET ORAL DAILY
Qty: 90 TABLET | Refills: 1 | Status: SHIPPED | OUTPATIENT
Start: 2021-04-02 | End: 2021-09-21

## 2021-04-02 NOTE — TELEPHONE ENCOUNTER
Pt received 3 year CLN recall  letter. Pt denies any GI symptoms at this time.  Please call [phone]

## 2021-04-02 NOTE — PROGRESS NOTES
HPI:   Colby Samaniego is a 78year old male who presents for a Medicare Subsequent Annual Wellness visit (Pt already had Initial Annual Wellness).     His last annual assessment has been over 1 year: Annual Physical due on 03/16/2021      Last physical has a completely normal functional assessment! Please see flow sheet section for details.       Depression Screening (PHQ-2/PHQ-9): Over the LAST 2 WEEKS   Little interest or pleasure in doing things: Not at all  Feeling down, depressed, or hopeless: Not a joint of both hands    Wt Readings from Last 3 Encounters:  04/02/21 : 210 lb 9.6 oz  08/25/20 : 204 lb  07/17/20 : 204 lb 12.8 oz     Last Cholesterol Labs:   Lab Results   Component Value Date    CHOLEST 197 03/17/2020    HDL 51 03/17/2020     (H) index is 29.37 kg/m² as calculated from the following:    Height as of this encounter: 5' 11\" (1.803 m).     Weight as of this encounter: 210 lb 9.6 oz.   04/02/21  1020 04/02/21  1036   BP: (!) 150/94 153/84   Pulse: 88    Temp: 98.1 °F (36.7 °C)    Weigh Eyes: Conjunctivae and EOM are normal.   Neck: Normal range of motion. No thyromegaly present. Cardiovascular: Normal rate, regular rhythm and normal heart sounds. Pulmonary/Chest: Effort normal and breath sounds normal. No respiratory distress.    L examination    Inflammation of joint of both hands  He can continue ibuprofen or Tylenol as needed. Closed compression fracture of thoracic vertebra with routine healing, subsequent encounter  -     XR LUMBAR SPINE (MIN 4 VIEWS) (CPT=72110);  Future  Last Fecal Occult Blood Annually No results found for: FOB No flowsheet data found. Glaucoma Screening      Ophthalmology Visit Annually: Diabetics, FHx Glaucoma, AA>50, > 65 No flowsheet data found.     Prostate Cancer Screening      PSA  Annually PS By signing my name below, Melinda Pizano,  attest that this documentation has been prepared under the direction and in the presence of Ivelisse Thayer DO.    Electronically Signed: Kezia Escalera, 4/2/2021, 10:29 AM.    I, Ivelisse Thayer DO,  pers

## 2021-04-02 NOTE — PATIENT INSTRUCTIONS
Can ask your pharmacist for the Shingrix vaccine which is a new shingles vaccine. It is 2 shots, 2 months apart.   Corrie Edwards's SCREENING SCHEDULE   Tests on this list are recommended by your physician but may not be covered, or covered at this brandy family history    Colorectal Cancer Screening Covered up to Age 76     Colonoscopy Screen   Covered every 10 years- more often if abnormal Colonoscopy due on 12/08/2020 Update Health Maintenance if applicable    Flex Sigmoidoscopy Screen  Covered every 5 y metal- TD and TDaP Not covered by Medicare Part B) No orders found for this or any previous visit.  This may be covered with your prescription benefits, but Medicare does not cover unless Medically needed    Zoster (Not covered by Medicare Part B) No orders

## 2021-04-05 NOTE — TELEPHONE ENCOUNTER
Last Procedure, Date, MD:  Dr. Sara Gutierrez Colonoscopy 12/8/2017  Last Diagnosis:  Multiple small colon polyps, normal ileocolonic anastomosis, minimal sigmoid colon diverticulosis, internal hemorrhoids  Recalled for (mth/yrs): 3 years  Sedation used prev

## 2021-04-05 NOTE — TELEPHONE ENCOUNTER
The patient's chart has been reviewed. Okay to schedule pt for 3 year colonoscopy recall r/t history of colon polyps with Dr. Shila Renner. Advise IV twilight or MAC sedation with split dose MiraLAX/Gatorade (related to national bowel prep shortage.   P

## 2021-04-13 ENCOUNTER — NURSE TRIAGE (OUTPATIENT)
Dept: FAMILY MEDICINE CLINIC | Facility: CLINIC | Age: 66
End: 2021-04-13

## 2021-04-13 ENCOUNTER — TELEPHONE (OUTPATIENT)
Dept: FAMILY MEDICINE CLINIC | Facility: CLINIC | Age: 66
End: 2021-04-13

## 2021-04-13 NOTE — TELEPHONE ENCOUNTER
Scheduled for:  Colonoscopy - 49274  Provider Name:  Dr. Shila Renner  Date:  6/22/21  Location:  [facility]  Sedation:  MAC  Time:  2:30 pm (pt is aware to arrive at 1:30 pm)  Prep:  Miralax/Gtorade, Prep instructions were given to pt over the phone, pt verbaliz

## 2021-04-13 NOTE — TELEPHONE ENCOUNTER
2 beers over the whole weekend would be fine. Otherwise agree with triage advice. Lisinopril 20 mg should not be bringing blood pressure down so much as to cause dizziness.   Hopefully he is keeping himself hydrated with water and not beer during the week

## 2021-04-13 NOTE — TELEPHONE ENCOUNTER
Reason for Disposition  • Taking a medicine that could cause dizziness (e.g., blood pressure medications, diuretics)    Protocols used: DIZZINESS-A-OH  Action Requested: Summary for Provider     []  Critical Lab, Recommendations Needed  [] Need Additiona

## 2021-04-13 NOTE — TELEPHONE ENCOUNTER
Pt advised of dr's recommendations. States that he has had 4 glasses of water since talking to the RN and he is feeling better now.

## 2021-04-13 NOTE — TELEPHONE ENCOUNTER
Results below relayed to patient per patient request.     Patient states he was not taking the atorvastatin when he took the labs; but he is now.     CBC shows no anemia   CMP shows your sugar, kidney function and liver function are normal.   TSH for your t

## 2021-04-15 NOTE — TELEPHONE ENCOUNTER
Spoke with patient. Full name and  verified. Patient schedule appointment for May 13 at 8:30 as he states he will be out of town the  for 2-3 weeks.

## 2021-05-14 ENCOUNTER — OFFICE VISIT (OUTPATIENT)
Dept: FAMILY MEDICINE CLINIC | Facility: CLINIC | Age: 66
End: 2021-05-14
Payer: MEDICARE

## 2021-05-14 VITALS
TEMPERATURE: 98 F | WEIGHT: 204.38 LBS | SYSTOLIC BLOOD PRESSURE: 120 MMHG | HEART RATE: 69 BPM | DIASTOLIC BLOOD PRESSURE: 74 MMHG | HEIGHT: 71 IN | BODY MASS INDEX: 28.61 KG/M2

## 2021-05-14 DIAGNOSIS — E78.2 MIXED HYPERLIPIDEMIA: ICD-10-CM

## 2021-05-14 DIAGNOSIS — J43.2 CENTRILOBULAR EMPHYSEMA (HCC): ICD-10-CM

## 2021-05-14 DIAGNOSIS — I10 ESSENTIAL HYPERTENSION: Primary | ICD-10-CM

## 2021-05-14 PROCEDURE — 3008F BODY MASS INDEX DOCD: CPT | Performed by: FAMILY MEDICINE

## 2021-05-14 PROCEDURE — 99214 OFFICE O/P EST MOD 30 MIN: CPT | Performed by: FAMILY MEDICINE

## 2021-05-14 PROCEDURE — 3074F SYST BP LT 130 MM HG: CPT | Performed by: FAMILY MEDICINE

## 2021-05-14 PROCEDURE — 3078F DIAST BP <80 MM HG: CPT | Performed by: FAMILY MEDICINE

## 2021-05-14 NOTE — PROGRESS NOTES
Patient ID: Amy Mirza is a 78year old male. HPI  Patient presents with: Follow - Up: Hypertension    Last seen by me on 4/2/2021. Pt c/o dizziness while standing up when he is taking his medications.  He reports he was eating 3-4 pretzels dizziness.            Medical History:      Past Medical History:   Diagnosis Date   • Cancer [facility])     colon cancer   • Carcinoma in situ of colon    • Hypercholesterolemia    • Lung infection 2015    Large mass S miliari   • Measles    • MRSA (methicillin Diagnoses and all orders for this visit:    Essential hypertension  -     BASIC METABOLIC PANEL (8)  Continue medications but continue lisinopril but take it at bedtime. Try to get up slowly so you do not get the orthostatic feeling.   He has not had a

## 2021-06-19 ENCOUNTER — LAB ENCOUNTER (OUTPATIENT)
Dept: LAB | Age: 66
End: 2021-06-19
Attending: INTERNAL MEDICINE
Payer: MEDICARE

## 2021-06-19 DIAGNOSIS — Z01.818 PRE-OP TESTING: ICD-10-CM

## 2021-06-22 ENCOUNTER — ANESTHESIA EVENT (OUTPATIENT)
Dept: ENDOSCOPY | Facility: HOSPITAL | Age: 66
End: 2021-06-22
Payer: MEDICARE

## 2021-06-22 ENCOUNTER — HOSPITAL ENCOUNTER (OUTPATIENT)
Facility: HOSPITAL | Age: 66
Setting detail: HOSPITAL OUTPATIENT SURGERY
Discharge: HOME | End: 2021-06-22
Attending: INTERNAL MEDICINE | Admitting: INTERNAL MEDICINE
Payer: MEDICARE

## 2021-06-22 ENCOUNTER — ANESTHESIA (OUTPATIENT)
Dept: ENDOSCOPY | Facility: HOSPITAL | Age: 66
End: 2021-06-22
Payer: MEDICARE

## 2021-06-22 VITALS
SYSTOLIC BLOOD PRESSURE: 121 MMHG | DIASTOLIC BLOOD PRESSURE: 88 MMHG | RESPIRATION RATE: 16 BRPM | HEART RATE: 63 BPM | HEIGHT: 71 IN | TEMPERATURE: 97 F | OXYGEN SATURATION: 98 % | WEIGHT: 200 LBS | BODY MASS INDEX: 28 KG/M2

## 2021-06-22 DIAGNOSIS — Z86.010 PERSONAL HISTORY OF COLONIC POLYPS: ICD-10-CM

## 2021-06-22 DIAGNOSIS — Z01.818 PRE-OP TESTING: Primary | ICD-10-CM

## 2021-06-22 PROCEDURE — 0DBL8ZX EXCISION OF TRANSVERSE COLON, VIA NATURAL OR ARTIFICIAL OPENING ENDOSCOPIC, DIAGNOSTIC: ICD-10-PCS | Performed by: INTERNAL MEDICINE

## 2021-06-22 PROCEDURE — 0DBP8ZX EXCISION OF RECTUM, VIA NATURAL OR ARTIFICIAL OPENING ENDOSCOPIC, DIAGNOSTIC: ICD-10-PCS | Performed by: INTERNAL MEDICINE

## 2021-06-22 PROCEDURE — 0DBN8ZX EXCISION OF SIGMOID COLON, VIA NATURAL OR ARTIFICIAL OPENING ENDOSCOPIC, DIAGNOSTIC: ICD-10-PCS | Performed by: INTERNAL MEDICINE

## 2021-06-22 PROCEDURE — 45385 COLONOSCOPY W/LESION REMOVAL: CPT | Performed by: INTERNAL MEDICINE

## 2021-06-22 RX ORDER — NALOXONE HYDROCHLORIDE 0.4 MG/ML
80 INJECTION, SOLUTION INTRAMUSCULAR; INTRAVENOUS; SUBCUTANEOUS AS NEEDED
Status: DISCONTINUED | OUTPATIENT
Start: 2021-06-22 | End: 2021-06-22

## 2021-06-22 RX ORDER — LIDOCAINE HYDROCHLORIDE 10 MG/ML
INJECTION, SOLUTION EPIDURAL; INFILTRATION; INTRACAUDAL; PERINEURAL AS NEEDED
Status: DISCONTINUED | OUTPATIENT
Start: 2021-06-22 | End: 2021-06-22 | Stop reason: SURG

## 2021-06-22 RX ORDER — SODIUM CHLORIDE, SODIUM LACTATE, POTASSIUM CHLORIDE, CALCIUM CHLORIDE 600; 310; 30; 20 MG/100ML; MG/100ML; MG/100ML; MG/100ML
INJECTION, SOLUTION INTRAVENOUS CONTINUOUS
Status: DISCONTINUED | OUTPATIENT
Start: 2021-06-22 | End: 2021-06-22

## 2021-06-22 RX ADMIN — SODIUM CHLORIDE, SODIUM LACTATE, POTASSIUM CHLORIDE, CALCIUM CHLORIDE: 600; 310; 30; 20 INJECTION, SOLUTION INTRAVENOUS at 14:52:00

## 2021-06-22 RX ADMIN — SODIUM CHLORIDE, SODIUM LACTATE, POTASSIUM CHLORIDE, CALCIUM CHLORIDE: 600; 310; 30; 20 INJECTION, SOLUTION INTRAVENOUS at 15:48:00

## 2021-06-22 RX ADMIN — LIDOCAINE HYDROCHLORIDE 20 MG: 10 INJECTION, SOLUTION EPIDURAL; INFILTRATION; INTRACAUDAL; PERINEURAL at 14:53:00

## 2021-06-22 NOTE — ANESTHESIA PREPROCEDURE EVALUATION
Anesthesia PreOp Note    HPI:     Lena Rahman is a 78year old male who presents for preoperative consultation requested by: Donna Lazo MD    Date of Surgery: 6/22/2021    Procedure(s):  COLONOSCOPY  Indication: Personal history of coloni Take 1 tablet (20 mg total) by mouth nightly. For cholesterol., Disp: 90 tablet, Rfl: 0, 6/20/2021  lisinopril 20 MG Oral Tab, Take 1 tablet (20 mg total) by mouth daily.  For blood pressure, Disp: 90 tablet, Rfl: 1, 6/20/2021  Albuterol Sulfate  (90 Exercise: Not Asked        Bike Helmet: Not Asked        Seat Belt: Not Asked        Self-Exams: Not Asked        Grew up on a farm: Not Asked        History of tanning: Not Asked        Outdoor occupation: Not Asked        Reaction to local anesthetic: No pulse is 78.  His respiration is 18 and oxygen saturation is 97%.    06/22/21  1411   BP: 132/87   Pulse: 78   Resp: 18   Temp: 97.4 °F (36.3 °C)   TempSrc: Oral   SpO2: 97%   Weight: 90.7 kg (200 lb)   Height: 1.803 m (5' 11\")        Anesthesia Evaluation

## 2021-06-22 NOTE — H&P
History & Physical Examination    Patient Name: Ting   MRN: [id number]  [facility]: [id number]  YOB: 1955    Diagnosis: Personal history of colon cancer, family history of colon cancer, personal history of adenomatous colon polyps weekly      SYSTEM Check if Review is Normal Check if Physical Exam is Normal If not normal, please explain:   SOFIYA Arroyo.Illy ] [ [facility]  [place].Illy ] [ X]    HEART Dina.Lily ] [ [facility] Dina.Lily ] [ Parviz Serve Dina.Lily ] [ Tania Espino Dina.Lily ] [ [place] [facility]        [ x ] I leone

## 2021-06-22 NOTE — OPERATIVE REPORT
[facility] Endoscopy Report      Date of Procedure:  06/22/21      Preoperative Diagnosis:  1. Personal history of colon cancer  2. Personal history of adenomatous colon polyps  3.   Family history of colon cancer      Postoperative Diagno transverse/proximal descending colon there were #7 polyps measuring 3-8 mm in size. All were cold snare excised and retrieved. 2.  In the sigmoid colon there were #2 3-5 mm sessile polyps which were cold snare excised and retrieved.   3.  In the distal re

## 2021-06-22 NOTE — ANESTHESIA POSTPROCEDURE EVALUATION
Patient: Franky Rosario    Procedure Summary     Date: 06/22/21 Room / Location: [address] [place] [address] ENDOSCOPY 04 / [address] [place] [address] ENDOSCOPY    Anesthesia Start: 8120 Anesthesia Stop: 6934    Procedure: COLONOSCOPY with polypectomy (N/A ) Diagnosis:       Personal history of col

## 2021-06-25 ENCOUNTER — TELEPHONE (OUTPATIENT)
Dept: GASTROENTEROLOGY | Facility: CLINIC | Age: 66
End: 2021-06-25

## 2021-06-25 NOTE — TELEPHONE ENCOUNTER
----- Message from Francois Oconnell MD sent at 6/24/2021  7:50 PM CDT -----  I spoke to Ben Quintanaaraceli. He is feeling well. He had #8-9 subcentimeter tubular adenomas removed. I discussed the significance. I recommended a surveillance colonoscopy in 3 years. VIRAL

## 2021-06-25 NOTE — TELEPHONE ENCOUNTER
Recall colon in 3 years per Dr. Carolina Mckinney. Last done:6/22/21  Next due:6/22/24    Updated health maintenance and pt outreach.

## 2021-06-28 DIAGNOSIS — E78.2 MIXED HYPERLIPIDEMIA: ICD-10-CM

## 2021-06-28 NOTE — TELEPHONE ENCOUNTER
Current Outpatient Medications:   •  atorvastatin (LIPITOR) 20 MG Oral Tab, Take 1 tablet (20 mg total) by mouth nightly.  For cholesterol., Disp: 90 tablet, Rfl: 0

## 2021-06-29 RX ORDER — ATORVASTATIN CALCIUM 20 MG/1
20 TABLET, FILM COATED ORAL NIGHTLY
Qty: 90 TABLET | Refills: 0 | Status: SHIPPED | OUTPATIENT
Start: 2021-06-29 | End: 2021-09-21

## 2021-07-07 ENCOUNTER — HOSPITAL ENCOUNTER (OUTPATIENT)
Age: 66
Discharge: HOME | End: 2021-07-07
Payer: MEDICARE

## 2021-07-07 VITALS
HEART RATE: 75 BPM | SYSTOLIC BLOOD PRESSURE: 118 MMHG | TEMPERATURE: 98 F | OXYGEN SATURATION: 98 % | DIASTOLIC BLOOD PRESSURE: 94 MMHG | RESPIRATION RATE: 18 BRPM

## 2021-07-07 DIAGNOSIS — L03.115 CELLULITIS OF RIGHT LOWER EXTREMITY: ICD-10-CM

## 2021-07-07 DIAGNOSIS — R21 RASH: Primary | ICD-10-CM

## 2021-07-07 PROCEDURE — 99213 OFFICE O/P EST LOW 20 MIN: CPT | Performed by: PHYSICIAN ASSISTANT

## 2021-07-07 RX ORDER — PREDNISONE 20 MG/1
40 TABLET ORAL DAILY
Qty: 10 TABLET | Refills: 0 | Status: SHIPPED | OUTPATIENT
Start: 2021-07-07 | End: 2021-07-12

## 2021-07-07 RX ORDER — CEPHALEXIN 500 MG/1
500 CAPSULE ORAL 4 TIMES DAILY
Qty: 28 CAPSULE | Refills: 0 | Status: SHIPPED | OUTPATIENT
Start: 2021-07-07 | End: 2021-07-14

## 2021-07-07 NOTE — ED PROVIDER NOTES
Patient Seen in: [facility]      History   Patient presents with: Foot Swelling    Stated Complaint: rash rt lower leg    HPI/Subjective:   HPI    79 yo male with PMH as listed below here for evaluation of rash, foot swelling.   Pt was in [facility] reviewed and negative except as noted above.     Physical Exam     ED Triage Vitals [07/07/21 1154]   BP (!) 118/94   Pulse 75   Resp 18   Temp 98.1 °F (36.7 °C)   Temp src Skin   SpO2 98 %   O2 Device None (Room air)       Current:BP (!) 118/94   Pulse 75 check with pmd in 2 days, ER return precautions advised       MDM                                   Disposition and Plan     Clinical Impression:  Rash  (primary encounter diagnosis)  Cellulitis of right lower extremity     Disposition:  Discharge  7/7/202

## 2021-07-07 NOTE — ED INITIAL ASSESSMENT (HPI)
Pt came in due to redness, swelling and itchiness on right leg and left foot for the past 3 days. Pt stated he was camping and had a small cut on top of right foot a few days ago. Pt c/o of severe itchiness. Pt denies any injury.  Pt denies any sob, fever,

## 2021-07-09 ENCOUNTER — OFFICE VISIT (OUTPATIENT)
Dept: FAMILY MEDICINE CLINIC | Facility: CLINIC | Age: 66
End: 2021-07-09
Payer: MEDICARE

## 2021-07-09 VITALS
HEART RATE: 80 BPM | DIASTOLIC BLOOD PRESSURE: 68 MMHG | BODY MASS INDEX: 28.28 KG/M2 | HEIGHT: 71 IN | WEIGHT: 202 LBS | SYSTOLIC BLOOD PRESSURE: 106 MMHG

## 2021-07-09 DIAGNOSIS — L03.119 CELLULITIS OF LOWER EXTREMITY, UNSPECIFIED LATERALITY: Primary | ICD-10-CM

## 2021-07-09 PROCEDURE — 3078F DIAST BP <80 MM HG: CPT | Performed by: NURSE PRACTITIONER

## 2021-07-09 PROCEDURE — 99214 OFFICE O/P EST MOD 30 MIN: CPT | Performed by: NURSE PRACTITIONER

## 2021-07-09 PROCEDURE — 3008F BODY MASS INDEX DOCD: CPT | Performed by: NURSE PRACTITIONER

## 2021-07-09 PROCEDURE — 3074F SYST BP LT 130 MM HG: CPT | Performed by: NURSE PRACTITIONER

## 2021-07-09 RX ORDER — CEPHALEXIN 500 MG/1
500 CAPSULE ORAL 2 TIMES DAILY
Qty: 6 CAPSULE | Refills: 0 | Status: SHIPPED | OUTPATIENT
Start: 2021-07-09 | End: 2021-07-12

## 2021-07-09 NOTE — PROGRESS NOTES
HPI  Pt presents for follow up UC on 7/7 for cellulitis bilat lower ext. Pt states was in [place] a couple of weeks ago and had some bug bites that he was itching and then [place]. Has a few scratches and cuts on legs.   L Social History    Socioeconomic History      Marital status:       Spouse name: Not on file      Number of children: Not on file      Years of education: Not on file      Highest education level: Not on file    Occupational History      Not on file Minutes of Exercise per Session:   Stress:       Feeling of Stress :   Social Connections:       Frequency of Communication with Friends and Family:       Frequency of Social Gatherings with Friends and Family:       Attends Jew Services:       Activ 500 mg bid for total of 10 days treatment  Wash legs w antibacterial soap; may apply antibiotics ointment and hydrocortisone cream  Please call if symptoms worsen or are not resolving.                          Discussed plan of care with pt and pt is in agr

## 2021-07-09 NOTE — ASSESSMENT & PLAN NOTE
Complete course of prednisone  Will add 3 days to cephalexin 500 mg bid for total of 10 days treatment  Wash legs w antibacterial soap; may apply antibiotics ointment and hydrocortisone cream  Please call if symptoms worsen or are not resolving.

## 2021-07-21 ENCOUNTER — OFFICE VISIT (OUTPATIENT)
Dept: FAMILY MEDICINE CLINIC | Facility: CLINIC | Age: 66
End: 2021-07-21
Payer: MEDICARE

## 2021-07-21 VITALS
DIASTOLIC BLOOD PRESSURE: 79 MMHG | SYSTOLIC BLOOD PRESSURE: 121 MMHG | WEIGHT: 201 LBS | HEIGHT: 71 IN | TEMPERATURE: 98 F | HEART RATE: 71 BPM | BODY MASS INDEX: 28.14 KG/M2

## 2021-07-21 DIAGNOSIS — L03.119 CELLULITIS OF LOWER EXTREMITY, UNSPECIFIED LATERALITY: Primary | ICD-10-CM

## 2021-07-21 PROCEDURE — 3008F BODY MASS INDEX DOCD: CPT | Performed by: NURSE PRACTITIONER

## 2021-07-21 PROCEDURE — 99213 OFFICE O/P EST LOW 20 MIN: CPT | Performed by: NURSE PRACTITIONER

## 2021-07-21 PROCEDURE — 3074F SYST BP LT 130 MM HG: CPT | Performed by: NURSE PRACTITIONER

## 2021-07-21 PROCEDURE — 3078F DIAST BP <80 MM HG: CPT | Performed by: NURSE PRACTITIONER

## 2021-07-21 RX ORDER — TRIAMCINOLONE ACETONIDE 5 MG/G
CREAM TOPICAL 3 TIMES DAILY
COMMUNITY

## 2021-07-21 RX ORDER — SULFAMETHOXAZOLE AND TRIMETHOPRIM 800; 160 MG/1; MG/1
1 TABLET ORAL 2 TIMES DAILY
Qty: 20 TABLET | Refills: 0 | Status: SHIPPED | OUTPATIENT
Start: 2021-07-21 | End: 2021-07-31

## 2021-07-21 NOTE — PROGRESS NOTES
HPI    Patient presents for cellulitis follow up of right foot. Seen on 7/7 and started on keflex x 7 days. Seen on 7/9 for follow up and given another 3 days to take 10 days total.  Reports that rash was completely resolved but has now returned. children: Not on file      Years of education: Not on file      Highest education level: Not on file    Occupational History      Not on file    Tobacco Use      Smoking status: Current Some Day Smoker        Packs/day: 0.50        Years: 40.00        Pack Communication with Friends and Family:       Frequency of Social Gatherings with Friends and Family:       Attends Buddhist Services:       Active Member of Clubs or Organizations:       Attends Club or Organization Meetings:       Marital Status:   Intim and time. Psychiatric:         Mood and Affect: Mood normal.         Behavior: Behavior normal.         Thought Content:  Thought content normal.         Judgment: Judgment normal.          Assessment and Plan:   Problem List Items Addressed This Visit

## 2021-07-21 NOTE — PATIENT INSTRUCTIONS
Cellulitis  Cellulitis is an infection of the deep layers of skin. A break in the skin, such as a cut or scratch, can let bacteria under the skin. If the bacteria get to deep layers of the skin, it can be serious.  If not treated, cellulitis can get into days on antibiotics  Deb last reviewed this educational content on 8/1/2019  © 7823-5534 [employer]. All rights reserved. This information is not intended as a substitute for professional medical care.  Always follow your healthcare prof

## 2021-08-05 ENCOUNTER — OFFICE VISIT (OUTPATIENT)
Dept: PULMONOLOGY | Facility: CLINIC | Age: 66
End: 2021-08-05
Payer: MEDICARE

## 2021-08-05 VITALS
HEART RATE: 81 BPM | OXYGEN SATURATION: 98 % | DIASTOLIC BLOOD PRESSURE: 75 MMHG | SYSTOLIC BLOOD PRESSURE: 124 MMHG | RESPIRATION RATE: 18 BRPM | BODY MASS INDEX: 28.7 KG/M2 | HEIGHT: 71 IN | WEIGHT: 205 LBS

## 2021-08-05 DIAGNOSIS — R05.9 COUGH: Primary | ICD-10-CM

## 2021-08-05 DIAGNOSIS — Z72.0 TOBACCO ABUSE: ICD-10-CM

## 2021-08-05 DIAGNOSIS — R93.89 ABNORMAL CXR: ICD-10-CM

## 2021-08-05 PROCEDURE — 3008F BODY MASS INDEX DOCD: CPT | Performed by: INTERNAL MEDICINE

## 2021-08-05 PROCEDURE — 3078F DIAST BP <80 MM HG: CPT | Performed by: INTERNAL MEDICINE

## 2021-08-05 PROCEDURE — 3074F SYST BP LT 130 MM HG: CPT | Performed by: INTERNAL MEDICINE

## 2021-08-05 PROCEDURE — 99214 OFFICE O/P EST MOD 30 MIN: CPT | Performed by: INTERNAL MEDICINE

## 2021-08-05 NOTE — PROGRESS NOTES
Pulmonary Follow Up Note    HPI:   Joe Cervantes is a 78year old male with Patient presents with:   Follow - Up: Check up    Mikaela Dumas, DO    Pt states \"infection\"  Rx pred and abx  LE after cut  Rash as well  Pt not 100% why Rx pred    Still wi in situ of colon    • High blood pressure    • High cholesterol    • Hypercholesterolemia    • Lung infection 2015    Large mass S miliari   • Measles    • MRSA (methicillin resistant Staphylococcus aureus) 12.2009    Per [employer] is JO ANN, double check wit Comment: weekly      Drug use: No    Other Topics      Concerns:        Caffeine Concern: Yes          6 cups coffee daily        Reaction to local anesthetic: No              PHYSICAL EXAM:   /75   Pulse 81   Resp 18   Ht 5' 11\" (1.803 m)   Wt 20 (ipratropium)  TBM Tracheobronchomalacia  VCD Vocal cord dysfunction

## 2021-08-14 ENCOUNTER — LAB ENCOUNTER (OUTPATIENT)
Dept: LAB | Age: 66
End: 2021-08-14
Attending: FAMILY MEDICINE
Payer: MEDICARE

## 2021-08-14 DIAGNOSIS — Z13.9 ENCOUNTER FOR SCREENING: ICD-10-CM

## 2021-08-15 LAB — SARS-COV-2 RNA RESP QL NAA+PROBE: NOT DETECTED

## 2021-08-17 ENCOUNTER — HOSPITAL ENCOUNTER (OUTPATIENT)
Dept: RESPIRATORY THERAPY | Facility: HOSPITAL | Age: 66
Discharge: HOME | End: 2021-08-17
Attending: INTERNAL MEDICINE
Payer: MEDICARE

## 2021-08-17 ENCOUNTER — HOSPITAL ENCOUNTER (OUTPATIENT)
Dept: CT IMAGING | Facility: HOSPITAL | Age: 66
Discharge: HOME | End: 2021-08-17
Attending: INTERNAL MEDICINE
Payer: MEDICARE

## 2021-08-17 DIAGNOSIS — R93.89 ABNORMAL CXR: ICD-10-CM

## 2021-08-17 DIAGNOSIS — Z72.0 TOBACCO ABUSE: ICD-10-CM

## 2021-08-17 DIAGNOSIS — R05.9 COUGH: ICD-10-CM

## 2021-08-17 PROCEDURE — 94060 EVALUATION OF WHEEZING: CPT | Performed by: INTERNAL MEDICINE

## 2021-08-17 PROCEDURE — 94729 DIFFUSING CAPACITY: CPT | Performed by: INTERNAL MEDICINE

## 2021-08-17 PROCEDURE — 71271 CT THORAX LUNG CANCER SCR C-: CPT | Performed by: INTERNAL MEDICINE

## 2021-08-27 ENCOUNTER — TELEPHONE (OUTPATIENT)
Dept: PULMONOLOGY | Facility: CLINIC | Age: 66
End: 2021-08-27

## 2021-08-27 NOTE — TELEPHONE ENCOUNTER
Spoke with patient requesting PFT results and CT lung screening results. Informed him of Dr. Rachel Hill result note on CT lung LD screening 8/17/21 and informed him that PFT results are not interpreted yet. Patient verbalized understanding.

## 2021-08-30 NOTE — PROCEDURES
Robert 59      MRN [id number]   Height  70 inh  Age 78year old   Weight  205 lbs  Sex Male         Spirometry:     FEV1 3.33 L which is 98%  FEV1/FVC ratio is 68% which is slightly

## 2021-08-30 NOTE — ADDENDUM NOTE
Encounter addended by: Mari Ward MD on: 8/30/2021 5:54 PM   Actions taken: Clinical Note Signed, Charge Capture section accepted

## 2021-09-08 NOTE — TELEPHONE ENCOUNTER
Last office visit 8/5/21  Last refill 5/21/20     Dr. Yenny Engle- Please review/sign pended refill request.

## 2021-09-09 RX ORDER — ALBUTEROL SULFATE 90 UG/1
AEROSOL, METERED RESPIRATORY (INHALATION)
Qty: 1 EACH | Refills: 5 | Status: SHIPPED | OUTPATIENT
Start: 2021-09-09

## 2021-09-21 DIAGNOSIS — E78.2 MIXED HYPERLIPIDEMIA: ICD-10-CM

## 2021-09-21 DIAGNOSIS — I10 ESSENTIAL HYPERTENSION: ICD-10-CM

## 2021-09-21 RX ORDER — LISINOPRIL 20 MG/1
20 TABLET ORAL DAILY
Qty: 90 TABLET | Refills: 1 | Status: SHIPPED | OUTPATIENT
Start: 2021-09-21

## 2021-09-21 RX ORDER — ATORVASTATIN CALCIUM 20 MG/1
20 TABLET, FILM COATED ORAL NIGHTLY
Qty: 90 TABLET | Refills: 1 | Status: SHIPPED | OUTPATIENT
Start: 2021-09-21

## 2021-09-21 NOTE — TELEPHONE ENCOUNTER
Refill passed per [facility], [facility] protocol.     Requested Prescriptions   Pending Prescriptions Disp Refills    ATORVASTATIN 20 MG Oral Tab [Pharmacy Med Name: ATORVASTATIN 20MG TABLETS] 90 tablet 0     Sig: TAKE 1 TABLET(20 MG) BY MOUTH EVERY NIGHT FOR CHO

## 2021-09-21 NOTE — TELEPHONE ENCOUNTER
Refill passed per [facility], [facility] protocol.     Requested Prescriptions   Pending Prescriptions Disp Refills    LISINOPRIL 20 MG Oral Tab [Pharmacy Med Name: LISINOPRIL 20MG TABLETS] 90 tablet 1     Sig: TAKE 1 TABLET(20 MG) BY MOUTH DAILY FOR BLOOD PRESSUR

## 2021-12-16 ENCOUNTER — OFFICE VISIT (OUTPATIENT)
Dept: FAMILY MEDICINE CLINIC | Facility: CLINIC | Age: 66
End: 2021-12-16
Payer: MEDICARE

## 2021-12-16 VITALS
WEIGHT: 209 LBS | HEIGHT: 71 IN | SYSTOLIC BLOOD PRESSURE: 133 MMHG | BODY MASS INDEX: 29.26 KG/M2 | DIASTOLIC BLOOD PRESSURE: 83 MMHG | HEART RATE: 79 BPM

## 2021-12-16 DIAGNOSIS — H60.11 CELLULITIS OF RIGHT EAR: Primary | ICD-10-CM

## 2021-12-16 PROCEDURE — G0008 ADMIN INFLUENZA VIRUS VAC: HCPCS | Performed by: PHYSICIAN ASSISTANT

## 2021-12-16 PROCEDURE — 3075F SYST BP GE 130 - 139MM HG: CPT | Performed by: PHYSICIAN ASSISTANT

## 2021-12-16 PROCEDURE — 99213 OFFICE O/P EST LOW 20 MIN: CPT | Performed by: PHYSICIAN ASSISTANT

## 2021-12-16 PROCEDURE — 3008F BODY MASS INDEX DOCD: CPT | Performed by: PHYSICIAN ASSISTANT

## 2021-12-16 PROCEDURE — 3079F DIAST BP 80-89 MM HG: CPT | Performed by: PHYSICIAN ASSISTANT

## 2021-12-16 PROCEDURE — 90662 IIV NO PRSV INCREASED AG IM: CPT | Performed by: PHYSICIAN ASSISTANT

## 2021-12-16 RX ORDER — CEPHALEXIN 500 MG/1
500 CAPSULE ORAL 3 TIMES DAILY
Qty: 21 CAPSULE | Refills: 0 | Status: SHIPPED | OUTPATIENT
Start: 2021-12-16 | End: 2021-12-23

## 2021-12-16 NOTE — PROGRESS NOTES
HPI:   HPI   78year-old male is here in the office complaining of right ear pain for the past 2 days. Patient states that the right ear lobe is red, painful and swollen. Patient has applied Triamcinolone without improvement.   Patient denies of fever, vomi double check with patient   • Mumps    • Varicella zoster        Past Surgical History:     Past Surgical History:   Procedure Laterality Date   • Bronchoscopy with brochial  9/9/15   • Colectomy  2000   • Colonoscopy     • Colonoscopy N/A 6/22/2021    Pro Outdoor occupation: Not Asked        Reaction to local anesthetic: No    Social History Narrative      Live with wife            Work [address]/            Dog/Cat at home    Social Determinants of Health  Financial Resource Strain: Not on Cardiovascular:      Rate and Rhythm: Normal rate and regular rhythm. Heart sounds: Normal heart sounds, S1 normal and S2 normal. No murmur heard. Pulmonary:      Effort: Pulmonary effort is normal.      Breath sounds: Normal breath sounds.  No

## 2021-12-21 ENCOUNTER — OFFICE VISIT (OUTPATIENT)
Dept: FAMILY MEDICINE CLINIC | Facility: CLINIC | Age: 66
End: 2021-12-21
Payer: MEDICARE

## 2021-12-21 ENCOUNTER — TELEPHONE (OUTPATIENT)
Dept: FAMILY MEDICINE CLINIC | Facility: CLINIC | Age: 66
End: 2021-12-21

## 2021-12-21 VITALS
HEART RATE: 84 BPM | DIASTOLIC BLOOD PRESSURE: 80 MMHG | HEIGHT: 71 IN | BODY MASS INDEX: 29.4 KG/M2 | SYSTOLIC BLOOD PRESSURE: 126 MMHG | WEIGHT: 210 LBS

## 2021-12-21 DIAGNOSIS — H60.11 CELLULITIS OF RIGHT EARLOBE: Primary | ICD-10-CM

## 2021-12-21 PROCEDURE — 3008F BODY MASS INDEX DOCD: CPT | Performed by: PHYSICIAN ASSISTANT

## 2021-12-21 PROCEDURE — 3074F SYST BP LT 130 MM HG: CPT | Performed by: PHYSICIAN ASSISTANT

## 2021-12-21 PROCEDURE — 99213 OFFICE O/P EST LOW 20 MIN: CPT | Performed by: PHYSICIAN ASSISTANT

## 2021-12-21 PROCEDURE — 3079F DIAST BP 80-89 MM HG: CPT | Performed by: PHYSICIAN ASSISTANT

## 2021-12-21 RX ORDER — SULFAMETHOXAZOLE AND TRIMETHOPRIM 800; 160 MG/1; MG/1
1 TABLET ORAL 2 TIMES DAILY
Qty: 14 TABLET | Refills: 0 | Status: SHIPPED | OUTPATIENT
Start: 2021-12-21 | End: 2021-12-28

## 2021-12-21 NOTE — TELEPHONE ENCOUNTER
Patient calling, identified name and , calling with update LOV   With Oniel Killian     Has taken the antibiotic and topical cream and states not much of an improvement  Believes cream  \" makes  it worse \"     Ear area is red, warm and tender to touch and

## 2021-12-22 NOTE — PROGRESS NOTES
HPI:   HPI  78year-old male is here in the office for follow up cellulitis of right earlobe. Patient states that he has been taking Keflex and applying Bactroban ointment without improvement.  The right earlobe is red, warm to touch, and tender to touch an Measles    • MRSA (methicillin resistant Staphylococcus aureus) 12.2009    Per [employer] is Jfef Corrales, double check with patient   • Mumps    • Varicella zoster        Past Surgical History:     Past Surgical History:   Procedure Laterality Date   • Bronchoscopy Self-Exams: Not Asked        Grew up on a farm: Not Asked        History of tanning: Not Asked        Outdoor occupation: Not Asked        Reaction to local anesthetic: No    Social History Narrative      Live with wife            Work [employer] road/car inspec normal. No murmur heard. Pulmonary:      Effort: Pulmonary effort is normal.      Breath sounds: Normal breath sounds. No wheezing or rales. Chest:      Chest wall: No tenderness. Lymphadenopathy:      Cervical: No cervical adenopathy.    Skin:

## 2021-12-23 ENCOUNTER — TELEPHONE (OUTPATIENT)
Dept: FAMILY MEDICINE CLINIC | Facility: CLINIC | Age: 66
End: 2021-12-23

## 2022-01-12 ENCOUNTER — TELEPHONE (OUTPATIENT)
Dept: FAMILY MEDICINE CLINIC | Facility: CLINIC | Age: 67
End: 2022-01-12

## 2022-01-12 ENCOUNTER — OFFICE VISIT (OUTPATIENT)
Dept: FAMILY MEDICINE CLINIC | Facility: CLINIC | Age: 67
End: 2022-01-12
Payer: MEDICARE

## 2022-01-12 VITALS
DIASTOLIC BLOOD PRESSURE: 72 MMHG | HEIGHT: 71 IN | HEART RATE: 76 BPM | SYSTOLIC BLOOD PRESSURE: 112 MMHG | BODY MASS INDEX: 29.54 KG/M2 | WEIGHT: 211 LBS

## 2022-01-12 DIAGNOSIS — H60.11 CELLULITIS OF RIGHT EARLOBE: Primary | ICD-10-CM

## 2022-01-12 PROCEDURE — 3008F BODY MASS INDEX DOCD: CPT | Performed by: PHYSICIAN ASSISTANT

## 2022-01-12 PROCEDURE — 3074F SYST BP LT 130 MM HG: CPT | Performed by: PHYSICIAN ASSISTANT

## 2022-01-12 PROCEDURE — 99213 OFFICE O/P EST LOW 20 MIN: CPT | Performed by: PHYSICIAN ASSISTANT

## 2022-01-12 PROCEDURE — 3078F DIAST BP <80 MM HG: CPT | Performed by: PHYSICIAN ASSISTANT

## 2022-01-12 RX ORDER — SULFAMETHOXAZOLE AND TRIMETHOPRIM 800; 160 MG/1; MG/1
1 TABLET ORAL 2 TIMES DAILY
Qty: 20 TABLET | Refills: 0 | Status: SHIPPED | OUTPATIENT
Start: 2022-01-12 | End: 2022-01-22

## 2022-01-12 RX ORDER — PREDNISONE 20 MG/1
20 TABLET ORAL DAILY
Qty: 5 TABLET | Refills: 0 | Status: SHIPPED | OUTPATIENT
Start: 2022-01-12 | End: 2022-01-17

## 2022-01-12 RX ORDER — PREDNISONE 20 MG/1
20 TABLET ORAL DAILY
Qty: 5 TABLET | Refills: 0 | Status: SHIPPED | OUTPATIENT
Start: 2022-01-12 | End: 2022-01-12

## 2022-01-12 NOTE — TELEPHONE ENCOUNTER
Spoke with patient ( verified)--saw Loraine Queen today, but pharmacy requesting clarification of prednisone Rx. He states pharmacy hightlighted Rx concern and faxed back to [facility] for response.  \"They told me it might be faster    Spoke with

## 2022-01-12 NOTE — TELEPHONE ENCOUNTER
Pt was seen 12/21/21 rash on right side of neck/right ear with drainage- s/s came back, f/u appt made

## 2022-01-12 NOTE — PROGRESS NOTES
HPI:   HPI  78year-old male is here in the office complaining of right ear lobe infection for the past 2 days. Patient states that the infection resolved after patient took Bactrim and came back after patient finished antibiotic.  Patient states that the e • Lung infection 2015    Large mass S miliari   • Measles    • MRSA (methicillin resistant Staphylococcus aureus) 12.2009    Per [employer] is Marli Hargrove, double check with patient   • Mumps    • Varicella zoster        Past Surgical History:     Past Surgical H Helmet: Not Asked        Seat Belt: Not Asked        Self-Exams: Not Asked        Grew up on a farm: Not Asked        History of tanning: Not Asked        Outdoor occupation: Not Asked        Reaction to local anesthetic: No    Social History Narrative Cardiovascular:      Rate and Rhythm: Normal rate and regular rhythm. Heart sounds: Normal heart sounds, S1 normal and S2 normal. No murmur heard. Pulmonary:      Effort: Pulmonary effort is normal.      Breath sounds: Normal breath sounds.  No

## 2022-01-13 ENCOUNTER — OFFICE VISIT (OUTPATIENT)
Dept: DERMATOLOGY CLINIC | Facility: CLINIC | Age: 67
End: 2022-01-13
Payer: MEDICARE

## 2022-01-13 DIAGNOSIS — L25.9 CONTACT DERMATITIS AND ECZEMA: Primary | ICD-10-CM

## 2022-01-13 DIAGNOSIS — L03.811 CELLULITIS AND ABSCESS OF HEAD: ICD-10-CM

## 2022-01-13 DIAGNOSIS — L02.811 CELLULITIS AND ABSCESS OF HEAD: ICD-10-CM

## 2022-01-13 PROCEDURE — 99213 OFFICE O/P EST LOW 20 MIN: CPT | Performed by: DERMATOLOGY

## 2022-01-13 RX ORDER — PREDNISONE 10 MG/1
TABLET ORAL
Qty: 18 TABLET | Refills: 0 | Status: SHIPPED | OUTPATIENT
Start: 2022-01-13

## 2022-01-13 RX ORDER — SULFAMETHOXAZOLE AND TRIMETHOPRIM 800; 160 MG/1; MG/1
1 TABLET ORAL 2 TIMES DAILY
Qty: 30 TABLET | Refills: 0 | Status: SHIPPED | OUTPATIENT
Start: 2022-01-13 | End: 2022-03-14

## 2022-01-15 NOTE — PROGRESS NOTES
Culture shows normal skin anson. Patient on Bactrim continue. Continue prednisone taper. Update from patient please. Overall clinical picture suggestive of contact dermatitis. He did have spreading involvement from ear and neck to his left eyelid at visit.

## 2022-01-17 NOTE — PROGRESS NOTES
Patient informed of test results and all KMT's recommendations. Voiced understanding. Pt states he is improving slowly. Swelling and rash are both going down. Rash is not spreading. He continues on bactrim and prednisone taper.

## 2022-01-30 NOTE — PROGRESS NOTES
Karen Jensen is a 78year old male. Patient presents with:  Derm Problem: LOV 8/5/19- Pt. with Hx of multiple MRSA infections.  Pt. present for swollen, red, itchy, draining lesion on R ear lobe, and bright red rash on his neck R side, for 2-3 wk Never used    Alcohol use: Yes      Alcohol/week: 10.0 standard drinks      Types: 12 Cans of beer per week      Comment: weekly    Drug use:  No                Current Outpatient Medications   Medication Sig Dispense Refill   • predniSONE 10 MG Oral Tab Ta Occupational History      Not on file    Tobacco Use      Smoking status: Current Some Day Smoker        Packs/day: 0.50        Years: 40.00        Pack years: 20        Types: Cigarettes        Quit date: 2015        Years since quittin.4      Sm on R ear lobe, and bright red rash on his neck R side, for 2-3 wks. Pt. currently using Rx Prednisone and Sulfamethoxazole-Trimethoprim prescribed by Dr. René Romero since 1/12/22.      On Bactrim from [facility], had prednisone brief taper with slight decrea AND PLAN:     Contact dermatitis and eczema  (primary encounter diagnosis)      Dermatitis. Suspect contact dermatitis, secondary staph infection. Culture sent.   We will continue to Bactrim, mupirocin and A&E ointment which has been helping we will incre Orders:  No orders of the defined types were placed in this encounter.

## 2022-02-05 ENCOUNTER — TELEPHONE (OUTPATIENT)
Dept: FAMILY MEDICINE CLINIC | Facility: CLINIC | Age: 67
End: 2022-02-05

## 2022-02-05 NOTE — TELEPHONE ENCOUNTER
Patient calls and states that his rash is not getting better. Still red/itchy/scaly. Saw derm 1/13. Advised to contact derm office to follow up with them for further instructions. Call transferred and phone number provided to pt. Voiced understanding with intent to comply.

## 2022-02-10 ENCOUNTER — TELEPHONE (OUTPATIENT)
Dept: DERMATOLOGY CLINIC | Facility: CLINIC | Age: 67
End: 2022-02-10

## 2022-02-10 NOTE — TELEPHONE ENCOUNTER
Spoke with pt to triage further. LOV notes and culture did not show any infections present. Pt states the neck rash was starting to improve but it is now getting worse and starting to drain fluid and travel to his ear lobe. Pt scheduled to have rash reassessed on 2/11/22.

## 2022-02-10 NOTE — TELEPHONE ENCOUNTER
Patient called    Asking to give condition update on his rash. Has been taking Prednisone and Bactrim.  Please call

## 2022-02-11 ENCOUNTER — OFFICE VISIT (OUTPATIENT)
Dept: DERMATOLOGY CLINIC | Facility: CLINIC | Age: 67
End: 2022-02-11
Payer: MEDICARE

## 2022-02-11 DIAGNOSIS — L25.9 CONTACT DERMATITIS AND ECZEMA: Primary | ICD-10-CM

## 2022-02-11 DIAGNOSIS — L03.811 CELLULITIS AND ABSCESS OF HEAD: ICD-10-CM

## 2022-02-11 DIAGNOSIS — L01.00 IMPETIGO: ICD-10-CM

## 2022-02-11 DIAGNOSIS — L02.811 CELLULITIS AND ABSCESS OF HEAD: ICD-10-CM

## 2022-02-11 PROCEDURE — 99213 OFFICE O/P EST LOW 20 MIN: CPT | Performed by: DERMATOLOGY

## 2022-02-11 RX ORDER — DOXYCYCLINE HYCLATE 100 MG/1
CAPSULE ORAL
Qty: 60 CAPSULE | Refills: 3 | Status: SHIPPED | OUTPATIENT
Start: 2022-02-11

## 2022-02-19 RX ORDER — LISINOPRIL 20 MG/1
20 TABLET ORAL DAILY
Qty: 90 TABLET | Refills: 1 | Status: SHIPPED | OUTPATIENT
Start: 2022-02-19

## 2022-02-19 NOTE — TELEPHONE ENCOUNTER
Refill passed per [employer], [facility] protocol. Requested Prescriptions   Pending Prescriptions Disp Refills    lisinopril 20 MG Oral Tab 90 tablet 1     Sig: Take 1 tablet (20 mg total) by mouth daily.         Hypertensive Medications Protocol Passed - 2/19/2022 10:47 AM        Passed - CMP or BMP in past 12 months        Passed - Appointment in past 6 or next 3 months        Passed - GFR Non- > 50     Lab Results   Component Value Date    GFRNAA 63 05/14/2021                        Recent Outpatient Visits              1 week ago     [facility] [place] [facility]"" - [place] [facility] Ben Vizcarra MD    Office Visit    1 month ago Contact dermatitis and eczema    [address] [facility] Ben Vizcarra MD    Office Visit    1 month ago Cellulitis of right earlobe    [address] [facility] Dorian Johns PA-C    Office Visit    2 months ago Cellulitis of right earlobe    [address] [facility] Dorian Johns [facility]    Office Visit    2 months ago Cellulitis of right ear    [address] Family Medicine Dorian Johns PA-C    Office Visit            Future Appointments         Provider Department Appt Notes    In 1 month [address] [facility] [address], [address], [place] physical

## 2022-02-19 NOTE — TELEPHONE ENCOUNTER
Patient has one pill left for the following medications. Patient requesting refill.        lisinopril 20 MG Oral Tab

## 2022-04-04 ENCOUNTER — TELEPHONE (OUTPATIENT)
Dept: FAMILY MEDICINE CLINIC | Facility: CLINIC | Age: 67
End: 2022-04-04

## 2022-04-04 ENCOUNTER — OFFICE VISIT (OUTPATIENT)
Dept: FAMILY MEDICINE CLINIC | Facility: CLINIC | Age: 67
End: 2022-04-04
Payer: MEDICARE

## 2022-04-04 VITALS
TEMPERATURE: 98 F | SYSTOLIC BLOOD PRESSURE: 122 MMHG | BODY MASS INDEX: 28.84 KG/M2 | HEIGHT: 71 IN | HEART RATE: 81 BPM | DIASTOLIC BLOOD PRESSURE: 72 MMHG | WEIGHT: 206 LBS

## 2022-04-04 DIAGNOSIS — J43.2 CENTRILOBULAR EMPHYSEMA (HCC): ICD-10-CM

## 2022-04-04 DIAGNOSIS — Z00.00 ADULT GENERAL MEDICAL EXAM: Primary | ICD-10-CM

## 2022-04-04 DIAGNOSIS — I10 ESSENTIAL HYPERTENSION: ICD-10-CM

## 2022-04-04 DIAGNOSIS — F17.200 TOBACCO USE DISORDER: ICD-10-CM

## 2022-04-04 DIAGNOSIS — E78.2 MIXED HYPERLIPIDEMIA: ICD-10-CM

## 2022-04-04 DIAGNOSIS — Z00.00 ENCOUNTER FOR ANNUAL HEALTH EXAMINATION: ICD-10-CM

## 2022-04-04 DIAGNOSIS — Z87.891 PERSONAL HISTORY OF TOBACCO USE, PRESENTING HAZARDS TO HEALTH: ICD-10-CM

## 2022-04-04 DIAGNOSIS — F17.210 CIGARETTE SMOKER: ICD-10-CM

## 2022-04-04 DIAGNOSIS — Z23 NEED FOR VACCINATION: ICD-10-CM

## 2022-04-04 DIAGNOSIS — J44.9 CHRONIC OBSTRUCTIVE PULMONARY DISEASE, UNSPECIFIED COPD TYPE (HCC): ICD-10-CM

## 2022-04-04 DIAGNOSIS — Z12.5 SCREENING PSA (PROSTATE SPECIFIC ANTIGEN): ICD-10-CM

## 2022-04-04 PROCEDURE — 3074F SYST BP LT 130 MM HG: CPT | Performed by: FAMILY MEDICINE

## 2022-04-04 PROCEDURE — 96160 PT-FOCUSED HLTH RISK ASSMT: CPT | Performed by: FAMILY MEDICINE

## 2022-04-04 PROCEDURE — G0009 ADMIN PNEUMOCOCCAL VACCINE: HCPCS | Performed by: FAMILY MEDICINE

## 2022-04-04 PROCEDURE — G0439 PPPS, SUBSEQ VISIT: HCPCS | Performed by: FAMILY MEDICINE

## 2022-04-04 PROCEDURE — 99406 BEHAV CHNG SMOKING 3-10 MIN: CPT | Performed by: FAMILY MEDICINE

## 2022-04-04 PROCEDURE — 99397 PER PM REEVAL EST PAT 65+ YR: CPT | Performed by: FAMILY MEDICINE

## 2022-04-04 PROCEDURE — 3008F BODY MASS INDEX DOCD: CPT | Performed by: FAMILY MEDICINE

## 2022-04-04 PROCEDURE — 3078F DIAST BP <80 MM HG: CPT | Performed by: FAMILY MEDICINE

## 2022-04-04 PROCEDURE — 90732 PPSV23 VACC 2 YRS+ SUBQ/IM: CPT | Performed by: FAMILY MEDICINE

## 2022-04-05 LAB
ABSOLUTE BASOPHILS: 67 CELLS/UL (ref 0–200)
ABSOLUTE EOSINOPHILS: 168 CELLS/UL (ref 15–500)
ABSOLUTE LYMPHOCYTES: 2024 CELLS/UL (ref 850–3900)
ABSOLUTE MONOCYTES: 714 CELLS/UL (ref 200–950)
ABSOLUTE NEUTROPHILS: 5426 CELLS/UL (ref 1500–7800)
ALBUMIN/GLOBULIN RATIO: 1.8 (CALC) (ref 1–2.5)
ALBUMIN: 4.5 G/DL (ref 3.6–5.1)
ALKALINE PHOSPHATASE: 70 U/L (ref 35–144)
ALT: 22 U/L (ref 9–46)
AST: 21 U/L (ref 10–35)
BASOPHILS: 0.8 %
BILIRUBIN, TOTAL: 0.8 MG/DL (ref 0.2–1.2)
BUN: 14 MG/DL (ref 7–25)
CALCIUM: 9.1 MG/DL (ref 8.6–10.3)
CARBON DIOXIDE: 25 MMOL/L (ref 20–32)
CHLORIDE: 105 MMOL/L (ref 98–110)
CHOL/HDLC RATIO: 2.9 (CALC)
CHOLESTEROL, TOTAL: 138 MG/DL
CREATININE: 0.99 MG/DL (ref 0.7–1.25)
EGFR IF AFRICN AM: 90 ML/MIN/1.73M2
EGFR IF NONAFRICN AM: 78 ML/MIN/1.73M2
EOSINOPHILS: 2 %
GLOBULIN: 2.5 G/DL (CALC) (ref 1.9–3.7)
GLUCOSE: 83 MG/DL (ref 65–99)
HDL CHOLESTEROL: 47 MG/DL
HEMATOCRIT: 44.7 % (ref 38.5–50)
HEMOGLOBIN: 15.4 G/DL (ref 13.2–17.1)
LDL-CHOLESTEROL: 70 MG/DL (CALC)
LYMPHOCYTES: 24.1 %
MCH: 32.8 PG (ref 27–33)
MCHC: 34.5 G/DL (ref 32–36)
MCV: 95.3 FL (ref 80–100)
MONOCYTES: 8.5 %
MPV: 10.7 FL (ref 7.5–12.5)
NEUTROPHILS: 64.6 %
NON-HDL CHOLESTEROL: 91 MG/DL (CALC)
PLATELET COUNT: 223 THOUSAND/UL (ref 140–400)
POTASSIUM: 4.4 MMOL/L (ref 3.5–5.3)
PROTEIN, TOTAL: 7 G/DL (ref 6.1–8.1)
PSA, TOTAL: 1.08 NG/ML
RDW: 12.4 % (ref 11–15)
RED BLOOD CELL COUNT: 4.69 MILLION/UL (ref 4.2–5.8)
SODIUM: 139 MMOL/L (ref 135–146)
TRIGLYCERIDES: 131 MG/DL
TSH W/REFLEX TO FT4: 1.85 MIU/L (ref 0.4–4.5)
WHITE BLOOD CELL COUNT: 8.4 THOUSAND/UL (ref 3.8–10.8)

## 2022-05-14 ENCOUNTER — APPOINTMENT (OUTPATIENT)
Dept: ULTRASOUND IMAGING | Facility: HOSPITAL | Age: 67
End: 2022-05-14
Attending: EMERGENCY MEDICINE
Payer: MEDICARE

## 2022-05-14 ENCOUNTER — HOSPITAL ENCOUNTER (EMERGENCY)
Facility: HOSPITAL | Age: 67
Discharge: HOME | End: 2022-05-14
Attending: EMERGENCY MEDICINE
Payer: MEDICARE

## 2022-05-14 ENCOUNTER — HOSPITAL ENCOUNTER (OUTPATIENT)
Age: 67
Discharge: ANOTHER HOSPITAL | End: 2022-05-14
Payer: MEDICARE

## 2022-05-14 VITALS
WEIGHT: 200 LBS | DIASTOLIC BLOOD PRESSURE: 77 MMHG | OXYGEN SATURATION: 99 % | HEART RATE: 88 BPM | HEIGHT: 71 IN | BODY MASS INDEX: 28 KG/M2 | RESPIRATION RATE: 18 BRPM | SYSTOLIC BLOOD PRESSURE: 134 MMHG | TEMPERATURE: 98 F

## 2022-05-14 VITALS
HEIGHT: 71 IN | OXYGEN SATURATION: 99 % | BODY MASS INDEX: 28 KG/M2 | HEART RATE: 65 BPM | TEMPERATURE: 98 F | RESPIRATION RATE: 21 BRPM | SYSTOLIC BLOOD PRESSURE: 118 MMHG | WEIGHT: 200 LBS | DIASTOLIC BLOOD PRESSURE: 79 MMHG

## 2022-05-14 DIAGNOSIS — L03.119 CELLULITIS OF LOWER EXTREMITY, UNSPECIFIED LATERALITY: Primary | ICD-10-CM

## 2022-05-14 DIAGNOSIS — L03.115 CELLULITIS OF RIGHT LOWER EXTREMITY: Primary | ICD-10-CM

## 2022-05-14 LAB
ANION GAP SERPL CALC-SCNC: 5 MMOL/L (ref 0–18)
BASOPHILS # BLD AUTO: 0.08 X10(3) UL (ref 0–0.2)
BASOPHILS NFR BLD AUTO: 0.7 %
BUN BLD-MCNC: 13 MG/DL (ref 7–18)
BUN/CREAT SERPL: 12.5 (ref 10–20)
CALCIUM BLD-MCNC: 8.3 MG/DL (ref 8.5–10.1)
CHLORIDE SERPL-SCNC: 113 MMOL/L (ref 98–112)
CO2 SERPL-SCNC: 23 MMOL/L (ref 21–32)
CREAT BLD-MCNC: 1.04 MG/DL
DEPRECATED RDW RBC AUTO: 46.1 FL (ref 35.1–46.3)
EOSINOPHIL # BLD AUTO: 0.56 X10(3) UL (ref 0–0.7)
EOSINOPHIL NFR BLD AUTO: 5.1 %
ERYTHROCYTE [DISTWIDTH] IN BLOOD BY AUTOMATED COUNT: 12.8 % (ref 11–15)
GLUCOSE BLD-MCNC: 97 MG/DL (ref 70–99)
HCT VFR BLD AUTO: 44.3 %
HGB BLD-MCNC: 15 G/DL
IMM GRANULOCYTES # BLD AUTO: 0.07 X10(3) UL (ref 0–1)
IMM GRANULOCYTES NFR BLD: 0.6 %
LYMPHOCYTES # BLD AUTO: 1.95 X10(3) UL (ref 1–4)
LYMPHOCYTES NFR BLD AUTO: 17.9 %
MCH RBC QN AUTO: 32.9 PG (ref 26–34)
MCHC RBC AUTO-ENTMCNC: 33.9 G/DL (ref 31–37)
MCV RBC AUTO: 97.1 FL
MONOCYTES # BLD AUTO: 1.12 X10(3) UL (ref 0.1–1)
MONOCYTES NFR BLD AUTO: 10.3 %
NEUTROPHILS # BLD AUTO: 7.11 X10 (3) UL (ref 1.5–7.7)
NEUTROPHILS # BLD AUTO: 7.11 X10(3) UL (ref 1.5–7.7)
NEUTROPHILS NFR BLD AUTO: 65.4 %
OSMOLALITY SERPL CALC.SUM OF ELEC: 292 MOSM/KG (ref 275–295)
PLATELET # BLD AUTO: 178 10(3)UL (ref 150–450)
POTASSIUM SERPL-SCNC: 4 MMOL/L (ref 3.5–5.1)
RBC # BLD AUTO: 4.56 X10(6)UL
SODIUM SERPL-SCNC: 141 MMOL/L (ref 136–145)
WBC # BLD AUTO: 10.9 X10(3) UL (ref 4–11)

## 2022-05-14 PROCEDURE — 80048 BASIC METABOLIC PNL TOTAL CA: CPT | Performed by: EMERGENCY MEDICINE

## 2022-05-14 PROCEDURE — 93971 EXTREMITY STUDY: CPT | Performed by: EMERGENCY MEDICINE

## 2022-05-14 PROCEDURE — 85025 COMPLETE CBC W/AUTO DIFF WBC: CPT | Performed by: EMERGENCY MEDICINE

## 2022-05-14 PROCEDURE — 87070 CULTURE OTHR SPECIMN AEROBIC: CPT | Performed by: EMERGENCY MEDICINE

## 2022-05-14 PROCEDURE — 87205 SMEAR GRAM STAIN: CPT | Performed by: EMERGENCY MEDICINE

## 2022-05-14 PROCEDURE — 99284 EMERGENCY DEPT VISIT MOD MDM: CPT

## 2022-05-14 PROCEDURE — 99205 OFFICE O/P NEW HI 60 MIN: CPT | Performed by: NURSE PRACTITIONER

## 2022-05-14 PROCEDURE — 87077 CULTURE AEROBIC IDENTIFY: CPT | Performed by: EMERGENCY MEDICINE

## 2022-05-14 PROCEDURE — 87186 SC STD MICRODIL/AGAR DIL: CPT | Performed by: EMERGENCY MEDICINE

## 2022-05-14 PROCEDURE — 36415 COLL VENOUS BLD VENIPUNCTURE: CPT

## 2022-05-14 RX ORDER — SULFAMETHOXAZOLE AND TRIMETHOPRIM 800; 160 MG/1; MG/1
1 TABLET ORAL 2 TIMES DAILY
Qty: 14 TABLET | Refills: 0 | Status: SHIPPED | OUTPATIENT
Start: 2022-05-14 | End: 2022-05-21

## 2022-05-14 RX ORDER — CEFADROXIL 500 MG/1
500 CAPSULE ORAL 2 TIMES DAILY
Qty: 14 CAPSULE | Refills: 0 | Status: SHIPPED | OUTPATIENT
Start: 2022-05-14 | End: 2022-05-21

## 2022-05-14 NOTE — ED INITIAL ASSESSMENT (HPI)
Patient presents a&o4/4 with approximately 8.5cmx5.5cm wound on R anterior lower extremity. No known injury or trauma. States noticed wound last week. + itching. + yellow drainage saturating guaze. States started taking old antibiotics (doxy) 3 or 4 days ago.  Denies fever/chills

## 2022-05-17 ENCOUNTER — TELEPHONE (OUTPATIENT)
Dept: FAMILY MEDICINE CLINIC | Facility: CLINIC | Age: 67
End: 2022-05-17

## 2022-05-17 ENCOUNTER — OFFICE VISIT (OUTPATIENT)
Dept: FAMILY MEDICINE CLINIC | Facility: CLINIC | Age: 67
End: 2022-05-17
Payer: MEDICARE

## 2022-05-17 VITALS
DIASTOLIC BLOOD PRESSURE: 78 MMHG | HEART RATE: 76 BPM | HEIGHT: 71 IN | SYSTOLIC BLOOD PRESSURE: 126 MMHG | WEIGHT: 204 LBS | BODY MASS INDEX: 28.56 KG/M2

## 2022-05-17 DIAGNOSIS — L03.115 CELLULITIS OF RIGHT LEG: Primary | ICD-10-CM

## 2022-05-17 DIAGNOSIS — L29.9 PRURITUS: ICD-10-CM

## 2022-05-17 DIAGNOSIS — L20.9 ATOPIC DERMATITIS, UNSPECIFIED TYPE: ICD-10-CM

## 2022-05-17 PROCEDURE — 3074F SYST BP LT 130 MM HG: CPT | Performed by: PHYSICIAN ASSISTANT

## 2022-05-17 PROCEDURE — 3008F BODY MASS INDEX DOCD: CPT | Performed by: PHYSICIAN ASSISTANT

## 2022-05-17 PROCEDURE — 99213 OFFICE O/P EST LOW 20 MIN: CPT | Performed by: PHYSICIAN ASSISTANT

## 2022-05-17 PROCEDURE — 3078F DIAST BP <80 MM HG: CPT | Performed by: PHYSICIAN ASSISTANT

## 2022-05-17 RX ORDER — CLINDAMYCIN HYDROCHLORIDE 300 MG/1
CAPSULE ORAL
COMMUNITY
Start: 2022-05-16

## 2022-05-17 RX ORDER — PREDNISONE 20 MG/1
TABLET ORAL
Qty: 10 TABLET | Refills: 0 | Status: SHIPPED | OUTPATIENT
Start: 2022-05-17

## 2022-05-17 RX ORDER — HYDROXYZINE HYDROCHLORIDE 25 MG/1
25 TABLET, FILM COATED ORAL EVERY 8 HOURS PRN
Qty: 30 TABLET | Refills: 0 | Status: SHIPPED | OUTPATIENT
Start: 2022-05-17

## 2022-05-17 NOTE — TELEPHONE ENCOUNTER
Notified pharmacy and they will work with patient to use discount card for medication. No PA done at this time.

## 2022-05-17 NOTE — TELEPHONE ENCOUNTER
hydrOXYzine 25 MG Oral Tab 30 tablet 0 5/17/2022     Sig - Route:  Take 1 tablet (25 mg total) by mouth every 8 (eight) hours as needed for Itching. - Oral    Sent to pharmacy as: hydrOXYzine HCl 25 MG Oral Tablet (Atarax)    E-Prescribing Status: Receipt confirmed by pharmacy (5/17/2022 12:19 PM CDT)    Prior authorization: Payer Waiting for Response      Please confirm diagnosis for medication

## 2022-05-24 NOTE — TELEPHONE ENCOUNTER
Notes Recorded by Dany Herring RN on 12/13/2017 at 9:04 AM CST  See tele enc  ------     Notes Recorded by Ish Ny MD on 12/12/2017 at 6:25 PM CST  I spoke to Taurus Antonio is feeling well. [facility] had #5 subcentimeter tubular adenomas removed. [facility] No tissue fragments measuring 1.2 x 0.6 x 0.2 cm in aggregate. The specimen is submitted entirely in cassette B1.  (aa)     Fran Padron M.D./[facility]      Interpretation   AbnormalAbnormal    Electronically signed by Mor Scott MD on 12/9/2017 at 12:35

## 2022-05-26 ENCOUNTER — TELEPHONE (OUTPATIENT)
Dept: FAMILY MEDICINE CLINIC | Facility: CLINIC | Age: 67
End: 2022-05-26

## 2022-05-26 ENCOUNTER — OFFICE VISIT (OUTPATIENT)
Dept: FAMILY MEDICINE CLINIC | Facility: CLINIC | Age: 67
End: 2022-05-26
Payer: MEDICARE

## 2022-05-26 VITALS
BODY MASS INDEX: 28.56 KG/M2 | HEART RATE: 76 BPM | DIASTOLIC BLOOD PRESSURE: 84 MMHG | WEIGHT: 204 LBS | SYSTOLIC BLOOD PRESSURE: 124 MMHG | HEIGHT: 71 IN

## 2022-05-26 DIAGNOSIS — S81.801D WOUND OF RIGHT LOWER EXTREMITY, SUBSEQUENT ENCOUNTER: Primary | ICD-10-CM

## 2022-05-26 PROCEDURE — 99213 OFFICE O/P EST LOW 20 MIN: CPT | Performed by: PHYSICIAN ASSISTANT

## 2022-05-26 PROCEDURE — 3074F SYST BP LT 130 MM HG: CPT | Performed by: PHYSICIAN ASSISTANT

## 2022-05-26 PROCEDURE — 3008F BODY MASS INDEX DOCD: CPT | Performed by: PHYSICIAN ASSISTANT

## 2022-05-26 PROCEDURE — 3079F DIAST BP 80-89 MM HG: CPT | Performed by: PHYSICIAN ASSISTANT

## 2022-05-26 RX ORDER — CLINDAMYCIN HYDROCHLORIDE 300 MG/1
300 CAPSULE ORAL 3 TIMES DAILY
Qty: 21 CAPSULE | Refills: 0 | Status: SHIPPED | OUTPATIENT
Start: 2022-05-26 | End: 2022-06-02

## 2022-05-26 NOTE — TELEPHONE ENCOUNTER
Patient was seen in ER 5/14/22 and in office 5/17/22 for right leg cellulitis. Patient states his right leg is still draining clear fluid, itchy and red. Patient states scab is coming off, area around the area, but still very red. Patient scheduled for an appointment today with [facility] for 1pm, [place] Location.

## 2022-05-31 ENCOUNTER — OFFICE VISIT (OUTPATIENT)
Dept: FAMILY MEDICINE CLINIC | Facility: CLINIC | Age: 67
End: 2022-05-31
Payer: MEDICARE

## 2022-05-31 ENCOUNTER — TELEPHONE (OUTPATIENT)
Dept: FAMILY MEDICINE CLINIC | Facility: CLINIC | Age: 67
End: 2022-05-31

## 2022-05-31 VITALS
DIASTOLIC BLOOD PRESSURE: 61 MMHG | HEIGHT: 71 IN | BODY MASS INDEX: 28.26 KG/M2 | HEART RATE: 88 BPM | SYSTOLIC BLOOD PRESSURE: 100 MMHG | TEMPERATURE: 97 F | WEIGHT: 201.88 LBS

## 2022-05-31 DIAGNOSIS — L03.115 CELLULITIS OF RIGHT LEG: ICD-10-CM

## 2022-05-31 DIAGNOSIS — R60.0 EDEMA OF RIGHT LOWER LEG: ICD-10-CM

## 2022-05-31 DIAGNOSIS — L24.9 IRRITANT CONTACT DERMATITIS, UNSPECIFIED TRIGGER: Primary | ICD-10-CM

## 2022-05-31 PROCEDURE — 96372 THER/PROPH/DIAG INJ SC/IM: CPT | Performed by: FAMILY MEDICINE

## 2022-05-31 PROCEDURE — 3008F BODY MASS INDEX DOCD: CPT | Performed by: FAMILY MEDICINE

## 2022-05-31 PROCEDURE — 3074F SYST BP LT 130 MM HG: CPT | Performed by: FAMILY MEDICINE

## 2022-05-31 PROCEDURE — 99214 OFFICE O/P EST MOD 30 MIN: CPT | Performed by: FAMILY MEDICINE

## 2022-05-31 PROCEDURE — 3078F DIAST BP <80 MM HG: CPT | Performed by: FAMILY MEDICINE

## 2022-05-31 RX ORDER — PREDNISONE 20 MG/1
TABLET ORAL
Qty: 18 TABLET | Refills: 0 | Status: SHIPPED | OUTPATIENT
Start: 2022-05-31

## 2022-05-31 RX ORDER — METHYLPREDNISOLONE ACETATE 80 MG/ML
80 INJECTION, SUSPENSION INTRA-ARTICULAR; INTRALESIONAL; INTRAMUSCULAR; SOFT TISSUE ONCE
Qty: 1 ML | Refills: 0 | Status: SHIPPED | OUTPATIENT
Start: 2022-05-31 | End: 2022-05-31 | Stop reason: CLARIF

## 2022-05-31 RX ORDER — METHYLPREDNISOLONE ACETATE 80 MG/ML
80 INJECTION, SUSPENSION INTRA-ARTICULAR; INTRALESIONAL; INTRAMUSCULAR; SOFT TISSUE ONCE
Status: COMPLETED | OUTPATIENT
Start: 2022-05-31 | End: 2022-05-31

## 2022-05-31 RX ADMIN — METHYLPREDNISOLONE ACETATE 80 MG: 80 INJECTION, SUSPENSION INTRA-ARTICULAR; INTRALESIONAL; INTRAMUSCULAR; SOFT TISSUE at 09:45:00

## 2022-05-31 NOTE — TELEPHONE ENCOUNTER
Patient calling ( identified name and  ) has been taking Clindamycin  ( 2nd course )  And has been itching for a few days ,  Has been taking Hydroxyzine and Benadryl , skin now hives ,  rash and is spreading , itching is not really resolved         Future Appointments   Date Time Provider Ginger Johnson   2022  9:30 AM Laith Keith DO [facility]   2022  8:30 AM Bessie Monae, APRN [address] [facility]     Informed mask is required for building entry and appointment. Patient verbalizes understanding and agrees.

## 2022-06-06 ENCOUNTER — OFFICE VISIT (OUTPATIENT)
Dept: WOUND CARE | Facility: HOSPITAL | Age: 67
End: 2022-06-06
Attending: CLINICAL NURSE SPECIALIST
Payer: MEDICARE

## 2022-06-06 VITALS
HEART RATE: 71 BPM | SYSTOLIC BLOOD PRESSURE: 156 MMHG | WEIGHT: 200 LBS | DIASTOLIC BLOOD PRESSURE: 91 MMHG | TEMPERATURE: 97 F | HEIGHT: 71 IN | BODY MASS INDEX: 28 KG/M2 | OXYGEN SATURATION: 90 % | RESPIRATION RATE: 18 BRPM

## 2022-06-06 DIAGNOSIS — L30.8 OTHER ECZEMA: Primary | ICD-10-CM

## 2022-06-06 DIAGNOSIS — S81.801D WOUND OF RIGHT LOWER EXTREMITY, SUBSEQUENT ENCOUNTER: ICD-10-CM

## 2022-06-06 PROBLEM — S81.801A WOUND OF RIGHT LEG: Status: ACTIVE | Noted: 2022-06-06

## 2022-06-06 PROBLEM — L30.9 ECZEMA: Status: ACTIVE | Noted: 2022-06-06

## 2022-06-06 PROCEDURE — 99215 OFFICE O/P EST HI 40 MIN: CPT | Performed by: CLINICAL NURSE SPECIALIST

## 2022-06-08 ENCOUNTER — OFFICE VISIT (OUTPATIENT)
Dept: FAMILY MEDICINE CLINIC | Facility: CLINIC | Age: 67
End: 2022-06-08
Payer: MEDICARE

## 2022-06-08 VITALS
HEART RATE: 81 BPM | HEIGHT: 71 IN | BODY MASS INDEX: 27.72 KG/M2 | DIASTOLIC BLOOD PRESSURE: 70 MMHG | SYSTOLIC BLOOD PRESSURE: 118 MMHG | WEIGHT: 198 LBS

## 2022-06-08 DIAGNOSIS — H61.22 IMPACTED CERUMEN OF LEFT EAR: Primary | ICD-10-CM

## 2022-06-08 PROCEDURE — 3078F DIAST BP <80 MM HG: CPT | Performed by: PHYSICIAN ASSISTANT

## 2022-06-08 PROCEDURE — 99213 OFFICE O/P EST LOW 20 MIN: CPT | Performed by: PHYSICIAN ASSISTANT

## 2022-06-08 PROCEDURE — 3074F SYST BP LT 130 MM HG: CPT | Performed by: PHYSICIAN ASSISTANT

## 2022-06-08 PROCEDURE — 3008F BODY MASS INDEX DOCD: CPT | Performed by: PHYSICIAN ASSISTANT

## 2022-06-08 PROCEDURE — 1126F AMNT PAIN NOTED NONE PRSNT: CPT | Performed by: PHYSICIAN ASSISTANT

## 2022-06-09 NOTE — PROCEDURES
Left ear irrigated with warm H20 and H2O2. Large amt of wax removed from left ear. Ear canals clear bilat and TMs intact without erythema. Pt denies dizziness or vertigo. Pt tolerate procedure well. Aftercare instructions given.

## 2022-06-13 ENCOUNTER — OFFICE VISIT (OUTPATIENT)
Dept: WOUND CARE | Facility: HOSPITAL | Age: 67
End: 2022-06-13
Attending: CLINICAL NURSE SPECIALIST
Payer: MEDICARE

## 2022-06-13 VITALS
HEART RATE: 69 BPM | DIASTOLIC BLOOD PRESSURE: 89 MMHG | SYSTOLIC BLOOD PRESSURE: 159 MMHG | TEMPERATURE: 97 F | RESPIRATION RATE: 18 BRPM | OXYGEN SATURATION: 100 %

## 2022-06-13 DIAGNOSIS — L30.8 OTHER ECZEMA: ICD-10-CM

## 2022-06-13 DIAGNOSIS — R21 RASH AND OTHER NONSPECIFIC SKIN ERUPTION: Primary | ICD-10-CM

## 2022-06-13 DIAGNOSIS — S81.801D WOUND OF RIGHT LOWER EXTREMITY, SUBSEQUENT ENCOUNTER: ICD-10-CM

## 2022-06-13 PROCEDURE — 99214 OFFICE O/P EST MOD 30 MIN: CPT | Performed by: CLINICAL NURSE SPECIALIST

## 2022-06-16 ENCOUNTER — TELEPHONE (OUTPATIENT)
Dept: FAMILY MEDICINE CLINIC | Facility: CLINIC | Age: 67
End: 2022-06-16

## 2022-06-16 ENCOUNTER — OFFICE VISIT (OUTPATIENT)
Dept: FAMILY MEDICINE CLINIC | Facility: CLINIC | Age: 67
End: 2022-06-16
Payer: MEDICARE

## 2022-06-16 VITALS
DIASTOLIC BLOOD PRESSURE: 76 MMHG | BODY MASS INDEX: 27.44 KG/M2 | SYSTOLIC BLOOD PRESSURE: 120 MMHG | HEIGHT: 71 IN | HEART RATE: 78 BPM | WEIGHT: 196 LBS

## 2022-06-16 DIAGNOSIS — L20.89 OTHER ATOPIC DERMATITIS: ICD-10-CM

## 2022-06-16 DIAGNOSIS — L29.9 PRURITUS: Primary | ICD-10-CM

## 2022-06-16 PROCEDURE — 3078F DIAST BP <80 MM HG: CPT | Performed by: PHYSICIAN ASSISTANT

## 2022-06-16 PROCEDURE — 1126F AMNT PAIN NOTED NONE PRSNT: CPT | Performed by: PHYSICIAN ASSISTANT

## 2022-06-16 PROCEDURE — 99213 OFFICE O/P EST LOW 20 MIN: CPT | Performed by: PHYSICIAN ASSISTANT

## 2022-06-16 PROCEDURE — 3008F BODY MASS INDEX DOCD: CPT | Performed by: PHYSICIAN ASSISTANT

## 2022-06-16 PROCEDURE — 3074F SYST BP LT 130 MM HG: CPT | Performed by: PHYSICIAN ASSISTANT

## 2022-06-16 RX ORDER — HYDROXYZINE HYDROCHLORIDE 25 MG/1
25 TABLET, FILM COATED ORAL EVERY 8 HOURS PRN
Qty: 30 TABLET | Refills: 0 | Status: SHIPPED | OUTPATIENT
Start: 2022-06-16

## 2022-06-16 RX ORDER — PREDNISONE 10 MG/1
TABLET ORAL
Qty: 18 TABLET | Refills: 0 | Status: SHIPPED | OUTPATIENT
Start: 2022-06-16 | End: 2022-06-20

## 2022-06-16 NOTE — TELEPHONE ENCOUNTER
Patient states he was at the wound clinic yesterday and he was told to follow up with his doctor for rash with \"little pimples\" and redness on his right arm. Patient states he had cellulitis on right ankle and leg and that has cleared up, but now rash spread to right arm. Patient scheduled for an appointment today with Deniz Schneider.     Future Appointments   Date Time Provider Ginger Johnson   6/16/2022  2:00 PM [facility]   6/20/2022  9:00 AM Alma Rosa Bloom, [address] [place] [address]

## 2022-06-16 NOTE — TELEPHONE ENCOUNTER
Received approval on PA for on Hydroxyzine. Notified pharmacy. Approved    Prior authorization approved Case ID: [id number]      Payer:  [address] [place] [address]  [phone]  Sultan Gretchen    [id number]:[id number]; Review Type:Prior Auth; Coverage Start Date:05/17/2022; Coverage End Date:06/16/2023;     Approval Details    Authorized from May 17, 2022 to June 16, 2023      Electronic appeal:  Not supported   View History

## 2022-06-17 ENCOUNTER — OFFICE VISIT (OUTPATIENT)
Dept: DERMATOLOGY CLINIC | Facility: CLINIC | Age: 67
End: 2022-06-17
Payer: MEDICARE

## 2022-06-17 DIAGNOSIS — L30.9 DERMATITIS: Primary | ICD-10-CM

## 2022-06-17 DIAGNOSIS — L30.3 INFECTIOUS ECZEMATOID DERMATITIS: ICD-10-CM

## 2022-06-17 DIAGNOSIS — L25.9 CONTACT DERMATITIS AND ECZEMA: ICD-10-CM

## 2022-06-17 PROCEDURE — 1126F AMNT PAIN NOTED NONE PRSNT: CPT | Performed by: DERMATOLOGY

## 2022-06-17 PROCEDURE — 99213 OFFICE O/P EST LOW 20 MIN: CPT | Performed by: DERMATOLOGY

## 2022-06-17 RX ORDER — LEVOCETIRIZINE DIHYDROCHLORIDE 5 MG/1
5 TABLET, FILM COATED ORAL EVERY EVENING
Qty: 30 TABLET | Refills: 2 | Status: SHIPPED | OUTPATIENT
Start: 2022-06-17

## 2022-06-17 RX ORDER — PREDNISONE 1 MG/1
5 TABLET ORAL DAILY
Qty: 10 TABLET | Refills: 1 | Status: SHIPPED | OUTPATIENT
Start: 2022-06-17

## 2022-06-17 RX ORDER — LEVOFLOXACIN 500 MG/1
500 TABLET, FILM COATED ORAL DAILY
Qty: 5 TABLET | Refills: 0 | Status: SHIPPED | OUTPATIENT
Start: 2022-06-17

## 2022-06-20 ENCOUNTER — OFFICE VISIT (OUTPATIENT)
Dept: WOUND CARE | Facility: HOSPITAL | Age: 67
End: 2022-06-20
Attending: CLINICAL NURSE SPECIALIST
Payer: MEDICARE

## 2022-06-20 VITALS
OXYGEN SATURATION: 98 % | RESPIRATION RATE: 17 BRPM | HEART RATE: 70 BPM | TEMPERATURE: 98 F | SYSTOLIC BLOOD PRESSURE: 105 MMHG | DIASTOLIC BLOOD PRESSURE: 72 MMHG

## 2022-06-20 DIAGNOSIS — L30.8 OTHER ECZEMA: ICD-10-CM

## 2022-06-20 DIAGNOSIS — S81.801D WOUND OF RIGHT LOWER EXTREMITY, SUBSEQUENT ENCOUNTER: ICD-10-CM

## 2022-06-20 DIAGNOSIS — R21 RASH AND OTHER NONSPECIFIC SKIN ERUPTION: ICD-10-CM

## 2022-06-20 PROCEDURE — 99213 OFFICE O/P EST LOW 20 MIN: CPT

## 2022-06-20 RX ORDER — NYSTATIN 100000 U/G
1 CREAM TOPICAL EVERY 12 HOURS
Qty: 15 G | Refills: 1 | Status: SHIPPED | OUTPATIENT
Start: 2022-06-20

## 2022-07-06 ENCOUNTER — OFFICE VISIT (OUTPATIENT)
Dept: WOUND CARE | Facility: HOSPITAL | Age: 67
End: 2022-07-06
Attending: CLINICAL NURSE SPECIALIST
Payer: MEDICARE

## 2022-07-06 VITALS
HEART RATE: 77 BPM | OXYGEN SATURATION: 98 % | TEMPERATURE: 98 F | SYSTOLIC BLOOD PRESSURE: 102 MMHG | DIASTOLIC BLOOD PRESSURE: 73 MMHG | RESPIRATION RATE: 17 BRPM

## 2022-07-06 DIAGNOSIS — R21 RASH AND OTHER NONSPECIFIC SKIN ERUPTION: ICD-10-CM

## 2022-07-06 DIAGNOSIS — L30.8 OTHER ECZEMA: ICD-10-CM

## 2022-07-06 DIAGNOSIS — L30.9 ECZEMA OF BOTH HANDS: ICD-10-CM

## 2022-07-06 PROCEDURE — 99213 OFFICE O/P EST LOW 20 MIN: CPT | Performed by: CLINICAL NURSE SPECIALIST

## 2022-07-07 ENCOUNTER — TELEPHONE (OUTPATIENT)
Dept: DERMATOLOGY CLINIC | Facility: CLINIC | Age: 67
End: 2022-07-07

## 2022-07-07 NOTE — TELEPHONE ENCOUNTER
LOV 6/17/22 with [facility] - Pt called to state rash on legs, wrists, and hands is not improving. Pt scheduled with Darcie Alvarado on 7/8/22 for assessment.

## 2022-07-08 ENCOUNTER — OFFICE VISIT (OUTPATIENT)
Dept: DERMATOLOGY CLINIC | Facility: CLINIC | Age: 67
End: 2022-07-08
Payer: MEDICARE

## 2022-07-08 DIAGNOSIS — L30.9 DERMATITIS: Primary | ICD-10-CM

## 2022-07-08 PROCEDURE — 1126F AMNT PAIN NOTED NONE PRSNT: CPT | Performed by: PHYSICIAN ASSISTANT

## 2022-07-08 PROCEDURE — 99213 OFFICE O/P EST LOW 20 MIN: CPT | Performed by: PHYSICIAN ASSISTANT

## 2022-07-08 RX ORDER — PREDNISONE 1 MG/1
TABLET ORAL
Qty: 10 TABLET | Refills: 1 | Status: SHIPPED | OUTPATIENT
Start: 2022-07-08

## 2022-07-25 ENCOUNTER — OFFICE VISIT (OUTPATIENT)
Dept: DERMATOLOGY CLINIC | Facility: CLINIC | Age: 67
End: 2022-07-25
Payer: MEDICARE

## 2022-07-25 DIAGNOSIS — L30.9 DERMATITIS: Primary | ICD-10-CM

## 2022-07-25 RX ORDER — PREDNISONE 1 MG/1
TABLET ORAL
Qty: 30 TABLET | Refills: 1 | Status: SHIPPED | OUTPATIENT
Start: 2022-07-25

## 2022-07-28 DIAGNOSIS — L29.9 PRURITUS: ICD-10-CM

## 2022-07-29 RX ORDER — HYDROXYZINE HYDROCHLORIDE 25 MG/1
TABLET, FILM COATED ORAL
Qty: 30 TABLET | Refills: 0 | OUTPATIENT
Start: 2022-07-29

## 2022-08-15 ENCOUNTER — OFFICE VISIT (OUTPATIENT)
Dept: DERMATOLOGY CLINIC | Facility: CLINIC | Age: 67
End: 2022-08-15
Payer: MEDICARE

## 2022-08-15 DIAGNOSIS — L30.9 DERMATITIS: Primary | ICD-10-CM

## 2022-08-15 RX ORDER — TRIAMCINOLONE ACETONIDE 5 MG/G
1 CREAM TOPICAL 2 TIMES DAILY
Qty: 454 G | Refills: 1 | Status: SHIPPED | OUTPATIENT
Start: 2022-08-15

## 2022-08-28 DIAGNOSIS — I10 ESSENTIAL HYPERTENSION: ICD-10-CM

## 2022-08-29 RX ORDER — LISINOPRIL 20 MG/1
20 TABLET ORAL DAILY
Qty: 90 TABLET | Refills: 1 | Status: SHIPPED | OUTPATIENT
Start: 2022-08-29 | End: 2022-12-16

## 2022-08-29 NOTE — TELEPHONE ENCOUNTER
Refill passed per [address] protocol.      Requested Prescriptions   Pending Prescriptions Disp Refills    LISINOPRIL 20 MG Oral Tab [Pharmacy Med Name: LISINOPRIL 20MG TABLETS] 90 tablet 1     Sig: TAKE 1 TABLET(20 MG) BY MOUTH DAILY        Hypertensive Medications Protocol Passed - 8/28/2022 12:33 PM        Passed - In person appointment in the past 12 or next 3 months       Recent Outpatient Visits              2 weeks ago Dermatitis    [facility] Armand Solitario PA-C    Office Visit    1 month ago Dermatitis    [facility] Armand Solitario PA-C    Office Visit    1 month ago Dermatitis    [facility] Armand Solitario PA-C    Office Visit    1 month ago Rash and other nonspecific skin eruption    [address], APRN    Office Visit    2 months ago Rash and other nonspecific skin eruption    [address], APRN    Office Visit     Future Appointments         Provider Department Appt Notes    In 3 weeks Armand Solitario PA-C [facility] 1 mos follow up                Passed - Last BP reading less than 140/90     BP Readings from Last 1 Encounters:  07/06/22 : 102/73                Passed - CMP or BMP in past 6 months     Recent Results (from the past 4392 hour(s))   Basic Metabolic Panel (8)    Collection Time: 05/14/22 10:51 AM   Result Value Ref Range    Glucose 97 70 - 99 mg/dL    Sodium 141 136 - 145 mmol/L    Potassium 4.0 3.5 - 5.1 mmol/L    Chloride 113 (H) 98 - 112 mmol/L    CO2 23.0 21.0 - 32.0 mmol/L    Anion Gap 5 0 - 18 mmol/L    BUN 13 7 - 18 mg/dL    Creatinine 1.04 0.70 - 1.30 mg/dL    BUN/CREA Ratio 12.5 10.0 - 20.0    Calcium, Total 8.3 (L) 8.5 - 10.1 mg/dL    Calculated Osmolality 292 275 - 295 mOsm/kg    GFR, Non- 73 >=60    GFR, -American 85 >=60     *Note: Due to a large number of results and/or encounters for the requested time period, some results have not been displayed. A complete set of results can be found in Results Review.                  Passed - In person appointment or virtual visit in the past 6 months       Recent Outpatient Visits              2 weeks ago Dermatitis    [facility] Wagner Rosales PA-C    Office Visit    1 month ago Dermatitis    [facility] Wagner Rosales PA-C    Office Visit    1 month ago Dermatitis    [facility] Wagner Rosales PA-C    Office Visit    1 month ago Rash and other nonspecific skin eruption    [address], APRN    Office Visit    2 months ago Rash and other nonspecific skin eruption    [address], APRN    Office Visit     Future Appointments         Provider Department Appt Notes    In 3 weeks Wagner Rosales PA-C [facility] 1 mos follow up                Passed - GFR > 50     No results found for: [facility]                    [unfilled]      [unfilled]

## 2022-09-19 ENCOUNTER — OFFICE VISIT (OUTPATIENT)
Dept: DERMATOLOGY CLINIC | Facility: CLINIC | Age: 67
End: 2022-09-19
Payer: MEDICARE

## 2022-09-19 DIAGNOSIS — L30.9 DERMATITIS: Primary | ICD-10-CM

## 2022-09-19 RX ORDER — PREDNISONE 1 MG/1
TABLET ORAL
Qty: 30 TABLET | Refills: 1 | Status: SHIPPED | OUTPATIENT
Start: 2022-09-19

## 2022-10-18 ENCOUNTER — OFFICE VISIT (OUTPATIENT)
Dept: DERMATOLOGY CLINIC | Facility: CLINIC | Age: 67
End: 2022-10-18
Payer: MEDICARE

## 2022-10-18 DIAGNOSIS — L30.9 DERMATITIS: Primary | ICD-10-CM

## 2022-10-18 PROCEDURE — 1126F AMNT PAIN NOTED NONE PRSNT: CPT | Performed by: PHYSICIAN ASSISTANT

## 2022-10-18 PROCEDURE — 99213 OFFICE O/P EST LOW 20 MIN: CPT | Performed by: PHYSICIAN ASSISTANT

## 2022-11-14 ENCOUNTER — OFFICE VISIT (OUTPATIENT)
Dept: DERMATOLOGY CLINIC | Facility: CLINIC | Age: 67
End: 2022-11-14
Payer: MEDICARE

## 2022-11-14 DIAGNOSIS — L30.9 DERMATITIS: Primary | ICD-10-CM

## 2022-12-19 ENCOUNTER — OFFICE VISIT (OUTPATIENT)
Dept: DERMATOLOGY CLINIC | Facility: CLINIC | Age: 67
End: 2022-12-19
Payer: MEDICARE

## 2022-12-19 DIAGNOSIS — L30.9 DERMATITIS: Primary | ICD-10-CM

## 2023-02-20 ENCOUNTER — TELEPHONE (OUTPATIENT)
Dept: DERMATOLOGY CLINIC | Facility: CLINIC | Age: 68
End: 2023-02-20

## 2023-02-20 ENCOUNTER — OFFICE VISIT (OUTPATIENT)
Dept: DERMATOLOGY CLINIC | Facility: CLINIC | Age: 68
End: 2023-02-20

## 2023-02-20 DIAGNOSIS — L30.9 DERMATITIS: Primary | ICD-10-CM

## 2023-02-20 RX ORDER — PREDNISONE 1 MG/1
TABLET ORAL
Qty: 90 TABLET | Refills: 0 | Status: SHIPPED | OUTPATIENT
Start: 2023-02-20

## 2023-02-20 RX ORDER — CEPHALEXIN 500 MG/1
500 CAPSULE ORAL 2 TIMES DAILY
Qty: 28 CAPSULE | Refills: 0 | Status: SHIPPED | OUTPATIENT
Start: 2023-02-20

## 2023-02-20 NOTE — TELEPHONE ENCOUNTER
Patient to start [address]. Forms completed and signed by patient. Insurance card printed. Forms given to provider for completion.

## 2023-02-20 NOTE — TELEPHONE ENCOUNTER
Completed Forms, Insurance card, and most recent clinical notes faxed over to [facility] and Hermila Gordon. Awaiting approval and further steps.

## 2023-05-04 ENCOUNTER — NURSE ONLY (OUTPATIENT)
Dept: DERMATOLOGY CLINIC | Facility: CLINIC | Age: 68
End: 2023-05-04

## 2023-05-04 DIAGNOSIS — L20.89 OTHER ATOPIC DERMATITIS: Primary | ICD-10-CM

## 2023-05-04 PROCEDURE — 1159F MED LIST DOCD IN RCRD: CPT | Performed by: PHYSICIAN ASSISTANT

## 2023-05-04 PROCEDURE — 96372 THER/PROPH/DIAG INJ SC/IM: CPT | Performed by: PHYSICIAN ASSISTANT

## 2023-05-04 PROCEDURE — 1126F AMNT PAIN NOTED NONE PRSNT: CPT | Performed by: PHYSICIAN ASSISTANT

## 2023-05-23 ENCOUNTER — TELEPHONE (OUTPATIENT)
Dept: FAMILY MEDICINE CLINIC | Facility: CLINIC | Age: 68
End: 2023-05-23

## 2023-05-23 NOTE — TELEPHONE ENCOUNTER
Message left for patient to contact [phone] and update primary care physician information. Patient is currently listed with Dr. Fátima Parnell.  Darren- Patient is due for his MA Supervisit

## 2023-06-15 PROBLEM — E78.00 HYPERCHOLESTEROLEMIA: Status: ACTIVE | Noted: 2023-06-15

## 2023-06-15 PROBLEM — L25.9 CONTACT DERMATITIS AND ECZEMA: Status: RESOLVED | Noted: 2017-08-28 | Resolved: 2023-06-15

## 2023-06-15 PROBLEM — M19.041 INFLAMMATION OF JOINT OF BOTH HANDS: Status: RESOLVED | Noted: 2020-08-25 | Resolved: 2023-06-15

## 2023-06-15 PROBLEM — L03.119 CELLULITIS AND ABSCESS OF HAND: Status: RESOLVED | Noted: 2019-07-31 | Resolved: 2023-06-15

## 2023-06-15 PROBLEM — L02.519 CELLULITIS AND ABSCESS OF HAND: Status: RESOLVED | Noted: 2019-07-31 | Resolved: 2023-06-15

## 2023-06-15 PROBLEM — S81.801A WOUND OF RIGHT LEG: Status: RESOLVED | Noted: 2022-06-06 | Resolved: 2023-06-15

## 2023-06-15 PROBLEM — I10 ESSENTIAL HYPERTENSION: Status: ACTIVE | Noted: 2023-06-15

## 2023-06-15 PROBLEM — M19.042 INFLAMMATION OF JOINT OF BOTH HANDS: Status: RESOLVED | Noted: 2020-08-25 | Resolved: 2023-06-15

## 2023-06-15 PROBLEM — R21 RASH AND OTHER NONSPECIFIC SKIN ERUPTION: Status: RESOLVED | Noted: 2022-06-13 | Resolved: 2023-06-15

## 2023-06-15 PROBLEM — L03.119 CELLULITIS OF LOWER EXTREMITY: Status: RESOLVED | Noted: 2021-07-09 | Resolved: 2023-06-15

## 2023-06-15 PROBLEM — L30.9 ECZEMA OF BOTH HANDS: Status: RESOLVED | Noted: 2020-03-16 | Resolved: 2023-06-15

## 2023-06-15 PROBLEM — L20.89 OTHER ATOPIC DERMATITIS: Status: ACTIVE | Noted: 2022-06-06

## 2023-06-20 ENCOUNTER — LAB ENCOUNTER (OUTPATIENT)
Dept: LAB | Age: 68
End: 2023-06-20
Attending: FAMILY MEDICINE
Payer: MEDICARE

## 2023-06-20 DIAGNOSIS — Z12.5 PROSTATE CANCER SCREENING: ICD-10-CM

## 2023-06-20 LAB — COMPLEXED PSA SERPL-MCNC: 1.55 NG/ML (ref ?–4)

## 2023-06-20 PROCEDURE — 80061 LIPID PANEL: CPT | Performed by: FAMILY MEDICINE

## 2023-06-20 PROCEDURE — 36415 COLL VENOUS BLD VENIPUNCTURE: CPT | Performed by: FAMILY MEDICINE

## 2023-06-20 PROCEDURE — 80053 COMPREHEN METABOLIC PANEL: CPT | Performed by: FAMILY MEDICINE

## 2023-06-20 PROCEDURE — 85025 COMPLETE CBC W/AUTO DIFF WBC: CPT | Performed by: FAMILY MEDICINE

## 2023-07-17 ENCOUNTER — TELEPHONE (OUTPATIENT)
Dept: DERMATOLOGY CLINIC | Facility: CLINIC | Age: 68
End: 2023-07-17

## 2023-07-17 ENCOUNTER — OFFICE VISIT (OUTPATIENT)
Dept: DERMATOLOGY CLINIC | Facility: CLINIC | Age: 68
End: 2023-07-17

## 2023-07-17 DIAGNOSIS — L20.89 OTHER ATOPIC DERMATITIS: Primary | ICD-10-CM

## 2023-07-17 DIAGNOSIS — T24.201A PARTIAL THICKNESS BURN OF RIGHT LOWER EXTREMITY, INITIAL ENCOUNTER: ICD-10-CM

## 2023-07-17 RX ORDER — CEPHALEXIN 500 MG/1
500 CAPSULE ORAL 2 TIMES DAILY
Qty: 28 CAPSULE | Refills: 0 | Status: SHIPPED | OUTPATIENT
Start: 2023-07-17

## 2023-07-17 RX ORDER — PREDNISONE 10 MG/1
TABLET ORAL
Qty: 90 TABLET | Refills: 0 | Status: SHIPPED | OUTPATIENT
Start: 2023-07-17

## 2023-07-17 NOTE — TELEPHONE ENCOUNTER
Patient called over weekend  and concerned he is having a reaction to dupixent. Stated he is having rash and eyelid swelling following last dose. Recommended benadryl ant night and 2 xyzal in morning. Please follow-up as he may need to hold next dose.     Daniele Valdes MD

## 2023-07-17 NOTE — TELEPHONE ENCOUNTER
Bria Agarwal - pt started the [address] on 5/4/23 and then had about 4 more injections after that. Pt states he started itching and having eyelid swelling on the July 4th injection but was able to get it to calm down with otc medications. Pt just recently took another Dupixent injection and had the same reaction with the rash and itching all over, eyelid swelling, and now hives on his arms. Pt currently taking Benadryl, Xyzal, and hydrocortisone but states he is still itching and it is as bad as when he first started having this issue. Pt advised to please hold the [address] until he receives advice from Bria Agarwal. Pt denies any trouble breathing, denies any trouble swallowing, denies any additional allergy symptoms. Please advise. Does pt need to come back in for an appt? Thank you.

## 2023-07-17 NOTE — TELEPHONE ENCOUNTER
Please call patient and tell him to come in later this evening so he can be seen by and Dr. Skye Dickerson today to piece together what we need to do.

## 2023-07-17 NOTE — PROGRESS NOTES
HPI:    Patient ID: Mitchell Quiñones is a 70year old male. Pt presents for follow-up on eczema flare. Patient is currently on Dupixent injections. Started in May '23. Patient stopped taking Prednisone 1 month ago. Using Triamcinolone cream on skin, Mupirocin and Neosporin on the Right leg. Patient is taking Xyzal, Hydroxyzine, and Levocetirizine daily. Patient took last Dupixent injection 07/14/23. Pt started flaring end of June. Patient is flaring with itchy hives/lesions on the arms, legs, and around bilateral eyes. Skin is red and eyes are also inflammed. No draining or tenderness noted. Hx of allergies to medications noted. Notes he would have sun sensitivity when going out in the sun even before starting the dupixent. Seems like it continued after the dupixent as well. He does have a 2nd degree burn on his right leg from an exhaust pipe. Notes the reaction would occur 2-3 days after the injection. He has stopped the prednisone a few months ago. Has been using neosporin on the burn as well. Review of Systems   Constitutional:  Negative for chills and fever. Musculoskeletal:  Negative for arthralgias and myalgias. Skin:  Positive for rash. Negative for color change and wound. Current Outpatient Medications   Medication Sig Dispense Refill    Dupilumab (DUPIXENT) 300 MG/2ML Subcutaneous Solution Pen-injector Inject into the skin. atorvastatin 20 MG Oral Tab Take 1 tablet (20 mg total) by mouth nightly. 90 tablet 3    lisinopril 20 MG Oral Tab Take 1 tablet (20 mg total) by mouth daily. 90 tablet 3    triamcinolone 0.5 % External Cream Apply 1 Application topically 2 (two) times daily. 454 g 1    levocetirizine 5 MG Oral Tab Take 1 tablet (5 mg total) by mouth every evening. 30 tablet 2    hydrOXYzine 25 MG Oral Tab Take 1 tablet (25 mg total) by mouth every 8 (eight) hours as needed for Itching.  30 tablet 0    mupirocin 2 % External Ointment Apply to affect lesions tid 60 g 0 predniSONE 5 MG Oral Tab 1 po every other day (Patient not taking: Reported on 7/17/2023) 30 tablet 1    nystatin 728534 UNIT/GM External Cream Apply 1 Application topically every 12 (twelve) hours. (Patient not taking: Reported on 7/8/2022) 15 g 1    ALBUTEROL SULFATE  (90 Base) MCG/ACT Inhalation Aero Soln INHALE 2 PUFFS INTO THE LUNGS FOUR TIMES DAILY AS NEEDED FOR WHEEZING (Patient not taking: Reported on 7/8/2022) 1 each 5     Allergies:  Latex                   RASH   There were no vitals taken for this visit. There is no height or weight on file to calculate BMI. PHYSICAL EXAM:   Physical Exam  Constitutional:       General: He is not in acute distress. Appearance: Normal appearance. Skin:     General: Skin is warm and dry. Findings: Rash present. Comments: Eczematous scaling skin noted on the hands and arms. No draining or tenderness noted. Dryness and erythema noted. 2nd degree burn noted on the right lower leg. No tenderness noted. Some serosanguinous draining noted. Scaling in areas around the burn. Blisters are healing well. Neurological:      Mental Status: He is alert and oriented to person, place, and time. ASSESSMENT/PLAN:   1. Other atopic dermatitis  -After discussion with patient, advised the following:  -Started triamcinolone  -Educated to apply 2 times per day  -Start prednisone  -Educated to take 40 mg daily  -Return in 1 week  -To call or follow-up with worsening symptoms or concerns.   -Pt was agreeable to plan and will comply with discussion above. 2. Partial thickness burn of right lower extremity, initial encounter  -After discussion with patient, advised the following:  -Started mupiricon  -Educated to apply 3 times per day for 1 week  -Start keflex  -Educated to take 1 pill 2 times per day for 2 weeks  -To call or follow-up with worsening symptoms or concerns.   -Pt was agreeable to plan and will comply with discussion above. No orders of the defined types were placed in this encounter.       Meds This Visit:  Requested Prescriptions      No prescriptions requested or ordered in this encounter       Imaging & Referrals:  None         #[id number]

## 2023-07-24 ENCOUNTER — OFFICE VISIT (OUTPATIENT)
Dept: DERMATOLOGY CLINIC | Facility: CLINIC | Age: 68
End: 2023-07-24

## 2023-07-24 DIAGNOSIS — L01.00 IMPETIGO: ICD-10-CM

## 2023-07-24 DIAGNOSIS — T24.201A PARTIAL THICKNESS BURN OF RIGHT LOWER EXTREMITY, INITIAL ENCOUNTER: ICD-10-CM

## 2023-07-24 DIAGNOSIS — L20.89 OTHER ATOPIC DERMATITIS: Primary | ICD-10-CM

## 2023-07-24 PROCEDURE — 1159F MED LIST DOCD IN RCRD: CPT | Performed by: DERMATOLOGY

## 2023-07-24 PROCEDURE — 99213 OFFICE O/P EST LOW 20 MIN: CPT | Performed by: DERMATOLOGY

## 2023-07-24 PROCEDURE — 1126F AMNT PAIN NOTED NONE PRSNT: CPT | Performed by: DERMATOLOGY

## 2023-07-24 PROCEDURE — 1160F RVW MEDS BY RX/DR IN RCRD: CPT | Performed by: DERMATOLOGY

## 2023-07-24 RX ORDER — PREDNISONE 10 MG/1
TABLET ORAL
Qty: 90 TABLET | Refills: 0 | Status: SHIPPED | OUTPATIENT
Start: 2023-07-24

## 2023-07-24 NOTE — PATIENT INSTRUCTIONS
Take prednisone 3 times daily ( 1 tab)x 5 days then 1 tablet 2x a day until next visit.   Take your dupixent as scheduled  Finish your antibioitcs

## 2023-07-30 NOTE — PROGRESS NOTES
Jeff Pizano is a 70year old male. Patient presents with:  Derm Problem: LOV 07/17/23. Pt presenting today with burn f/u to R lower extremity. Pt states some improvement. Denies any pain. Pt currently using Mupiricon, and Keflex with some improvement            Latex  Current Outpatient Medications   Medication Sig Dispense Refill    predniSONE 10 MG Oral Tab Take 3 tabs by mouth daily 90 tablet 0    cephalexin (KEFLEX) 500 MG Oral Cap Take 1 capsule (500 mg total) by mouth 2 (two) times daily. 28 capsule 0    mupirocin 2 % External Ointment Apply 1 Application topically 3 (three) times daily. 30 g 0    triamcinolone 0.1 % External Ointment Apply 1 Application topically 2 (two) times daily. 453.6 g 1    Dupilumab (DUPIXENT) 300 MG/2ML Subcutaneous Solution Pen-injector Inject into the skin. atorvastatin 20 MG Oral Tab Take 1 tablet (20 mg total) by mouth nightly. 90 tablet 3    lisinopril 20 MG Oral Tab Take 1 tablet (20 mg total) by mouth daily. 90 tablet 3    triamcinolone 0.5 % External Cream Apply 1 Application topically 2 (two) times daily. 454 g 1    levocetirizine 5 MG Oral Tab Take 1 tablet (5 mg total) by mouth every evening. 30 tablet 2    hydrOXYzine 25 MG Oral Tab Take 1 tablet (25 mg total) by mouth every 8 (eight) hours as needed for Itching. 30 tablet 0    mupirocin 2 % External Ointment Apply to affect lesions tid 60 g 0    predniSONE 5 MG Oral Tab 1 po every other day (Patient not taking: Reported on 7/17/2023) 30 tablet 1    nystatin 760436 UNIT/GM External Cream Apply 1 Application topically every 12 (twelve) hours.  (Patient not taking: Reported on 7/8/2022) 15 g 1    ALBUTEROL SULFATE  (90 Base) MCG/ACT Inhalation Aero Soln INHALE 2 PUFFS INTO THE LUNGS FOUR TIMES DAILY AS NEEDED FOR WHEEZING (Patient not taking: Reported on 7/8/2022) 1 each 5      Past Medical History:   Diagnosis Date    Cancer ([facility] [address]     colon cancer    Carcinoma in situ of colon     High blood pressure High cholesterol     Hypercholesterolemia     Lung infection 2015    Large mass S [address]    Measles     MRSA (methicillin resistant Staphylococcus aureus) 12.    Per [employer] is Riaz Baptiste, double check with patient    Mumps     Varicella zoster       Social History:  Social History     Socioeconomic History    Marital status:    Tobacco Use    Smoking status: Some Days     Packs/day: 1.00     Years: 40.00     Pack years: 40.00     Types: Cigarettes     Last attempt to quit: 2015     Years since quittin.9    Smokeless tobacco: Never    Tobacco comments:     E-cig rarely   Vaping Use    Vaping Use: Never used   Substance and Sexual Activity    Alcohol use: Yes     Alcohol/week: 6.0 standard drinks of alcohol     Types: 6 Cans of beer per week     Comment: weekly    Drug use: No   Other Topics Concern    Caffeine Concern Yes     Comment: 6 cups coffee daily    Reaction to local anesthetic No    Pt has a pacemaker No    Pt has a defibrillator No   Social History Narrative    Live with wife        Work [address]/        Dog/Cat at home                 Current Outpatient Medications   Medication Sig Dispense Refill    predniSONE 10 MG Oral Tab Take 3 tabs by mouth daily 90 tablet 0    cephalexin (KEFLEX) 500 MG Oral Cap Take 1 capsule (500 mg total) by mouth 2 (two) times daily. 28 capsule 0    mupirocin 2 % External Ointment Apply 1 Application topically 3 (three) times daily. 30 g 0    triamcinolone 0.1 % External Ointment Apply 1 Application topically 2 (two) times daily. 453.6 g 1    Dupilumab (DUPIXENT) 300 MG/2ML Subcutaneous Solution Pen-injector Inject into the skin. atorvastatin 20 MG Oral Tab Take 1 tablet (20 mg total) by mouth nightly. 90 tablet 3    lisinopril 20 MG Oral Tab Take 1 tablet (20 mg total) by mouth daily. 90 tablet 3    triamcinolone 0.5 % External Cream Apply 1 Application topically 2 (two) times daily.  454 g 1    levocetirizine 5 MG Oral Tab Take 1 tablet (5 mg total) by mouth every evening. 30 tablet 2    hydrOXYzine 25 MG Oral Tab Take 1 tablet (25 mg total) by mouth every 8 (eight) hours as needed for Itching. 30 tablet 0    mupirocin 2 % External Ointment Apply to affect lesions tid 60 g 0    predniSONE 5 MG Oral Tab 1 po every other day (Patient not taking: Reported on 2023) 30 tablet 1    nystatin [id number] UNIT/GM External Cream Apply 1 Application topically every 12 (twelve) hours.  (Patient not taking: Reported on 2022) 15 g 1    ALBUTEROL SULFATE  (90 Base) MCG/ACT Inhalation Aero Soln INHALE 2 PUFFS INTO THE LUNGS FOUR TIMES DAILY AS NEEDED FOR WHEEZING (Patient not taking: Reported on 2022) 1 each 5     Allergies:     Latex                   RASH    Past Medical History:   Diagnosis Date    Cancer ([facility] [address]     colon cancer    Carcinoma in situ of colon     High blood pressure     High cholesterol     Hypercholesterolemia     Lung infection     Large mass S [address]    Measles     MRSA (methicillin resistant Staphylococcus aureus) 12    Per [employer] is Mann Ritter, double check with patient    Mumps     Varicella zoster      Past Surgical History:   Procedure Laterality Date    BRONCHOSCOPY WITH BROCHIAL  9/9/15    COLECTOMY      COLONOSCOPY      COLONOSCOPY N/A 2021    Procedure: COLONOSCOPY with polypectomy;  Surgeon: Denys Rose MD;  Location: [address] [place] [address] ENDOSCOPY    FOOT/TOES SURGERY PROC UNLISTED       Social History    Socioeconomic History      Marital status:       Spouse name: Not on file      Number of children: Not on file      Years of education: Not on file      Highest education level: Not on file    Occupational History      Not on file    Tobacco Use      Smoking status: Some Days        Packs/day: 1.00        Years: 40.00        Pack years: 40        Types: Cigarettes        Last attempt to quit: 2015        Years since quittin.9      Smokeless tobacco: Never      Tobacco comments: E-cig rarely Vaping Use      Vaping Use: Never used    Substance and Sexual Activity      Alcohol use: Yes        Alcohol/week: 6.0 standard drinks of alcohol        Types: 6 Cans of beer per week        Comment: weekly      Drug use: No      Sexual activity: Not on file    Other Topics      Concerns:         Service: Not Asked        Blood Transfusions: Not Asked        Caffeine Concern: Yes          6 cups coffee daily        Occupational Exposure: Not Asked        Hobby Hazards: Not Asked        Sleep Concern: Not Asked        Stress Concern: Not Asked        Weight Concern: Not Asked        Special Diet: Not Asked        Back Care: Not Asked        Exercise: Not Asked        Bike Helmet: Not Asked        Seat Belt: Not Asked        Self-Exams: Not Asked        Grew up on a farm: Not Asked        History of tanning: Not Asked        Outdoor occupation: Not Asked        Reaction to local anesthetic: No        Pt has a pacemaker: No        Pt has a defibrillator: No    Social History Narrative      Live with wife            Work [address]/            Dog/Cat at home    Social Determinants of Health  Financial Resource Strain: Not on file  Food Insecurity: Not on file  Transportation Needs: Not on file  Physical Activity: Not on file  Stress: Not on file  Social Connections: Not on file  Housing Stability: Not on file  Family History   Problem Relation Age of Onset    Cancer Father         pancreatic    Cancer Mother         skin    Diabetes Mother                       HPI :      Patient presents with:  Derm Problem: LOV 07/17/23. Pt presenting today with burn f/u to R lower extremity. Pt states some improvement. Denies any pain. Pt currently using Mupiricon, and Keflex with some improvement    Overall improvement on the lower extremities using mupirocin on cephalexin and prednisone taper currently on 40 mg daily. Is due for his Dupixent this week  Patient presents with concerns above.     Past notes/ records and appropriate/relevant lab results including pathology and past body maps reviewed. Updated and new information noted in current visit. ROS:    Denies any other systemic complaints. No fevers, chills, night sweats, sensitivity to the sun, deeper lumps or bumps. No other skin complaints. History, medications, allergies as noted. Physical examination: Patient  well-developed well-nourished, alert oriented in no acute distress. Exam of involved, appropriate areas of skin performed, including scalp, head, neck, face,nails, hair, external eyes, including conjunctival mucosa, eyelids, lips, external ears, back, chest, abdomen, arms, legs, palms. Remarkable for lesions as noted   Right calf crusted areas improving, background erythema significantly improved redness more generalized flare of dermatitis improved with decreased edema crusting erythema nearly resolved     ASSESSMENT AND PLAN:     Other atopic dermatitis  (primary encounter diagnosis)  Partial thickness burn of right lower extremity, initial encounter  Impetigo    Assessment / plan: Atopic dermatitis flaring with secondary infection burn. Burn and Improving continue with mupirocin complete course of cephalexin  Improving    He will resume Dupixent aware prednisone to be continued at this time as well    Atopic dermatitis, flaring prednisone decreased to 30 mg for the next 5 days then 20 mg for 5 days. We will plan follow-up in approximately 10 days patient does have a race out of town in a couple of weeks. Ideally we will follow-up before then. Continue antibiotics for the time being    Burn healing right calf  Local care discussed continue mupirocin    Let us know if he needs refills believes he does need a refill on the prednisone sent. We will plan recheck in 10 days    Pathophysiology discussed with patient. Therapeutic options reviewed. See  Medications in grid. Instructions reviewed at length.      General skin care questions answered. Reassurance regarding benign skin lesions. Signs and symptoms of skin cancer, ABCDE's of melanoma briefly reviewed. Sunscreen use, sun protection, encouraged. Followup as noted in rtc or p.r.n. Encounter Times  Including precharting, reviewing chart, prior notes obtaining history: 5 minutes, medical exam :10 minutes, notes on body map, plan, counseling 10minutes My total time spent caring for the patient on the day of the encounter: 25 minutes       The patient indicates understanding of these issues and agrees to the plan. The patient is asked to return as noted in follow-up /as noted above    This note was generated using Dragon voice recognition software. Please contact me regarding any confusion resulting from errors in recognition. Note to patient and family: The [id number] makes medical notes like these available to patients. However, be advised this is a medical document. It is intended as [id number] communication and monitoring of a patient's care needs. It is written in medical language and may contain abbreviations or verbiage that are unfamiliar. It may appear blunt or direct. Medical documents are intended to carry relevant information, facts as evident and the clinical opinion of the practitioner. No orders of the defined types were placed in this encounter. Meds & Refills for this Visit:   Requested Prescriptions     Signed Prescriptions Disp Refills    predniSONE 10 MG Oral Tab 90 tablet 0     Sig: Take 3 tabs by mouth daily       Other atopic dermatitis  (primary encounter diagnosis)  Partial thickness burn of right lower extremity, initial encounter  Impetigo    No orders of the defined types were placed in this encounter. Results From Past 48 Hours:  No results found for this or any previous visit (from the past 48 hour(s)).     Meds This Visit:      Imaging Orders:  None     Referral Orders:  No orders of the defined types were placed in this encounter.

## 2023-07-31 ENCOUNTER — TELEPHONE (OUTPATIENT)
Dept: DERMATOLOGY CLINIC | Facility: CLINIC | Age: 68
End: 2023-07-31

## 2023-07-31 NOTE — TELEPHONE ENCOUNTER
Deshawn Quiles - is pt resuming Dupixent? If so, fax from [employer] placed in your bin in the triage room for refill approval.  Please advise. Thank you. Staff - please fax to [facility]. Thank you.

## 2023-08-03 ENCOUNTER — OFFICE VISIT (OUTPATIENT)
Dept: DERMATOLOGY CLINIC | Facility: CLINIC | Age: 68
End: 2023-08-03

## 2023-08-03 DIAGNOSIS — L20.89 OTHER ATOPIC DERMATITIS: Primary | ICD-10-CM

## 2023-08-03 PROCEDURE — 1159F MED LIST DOCD IN RCRD: CPT | Performed by: PHYSICIAN ASSISTANT

## 2023-08-03 PROCEDURE — 99213 OFFICE O/P EST LOW 20 MIN: CPT | Performed by: PHYSICIAN ASSISTANT
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
## 2023-08-04 ENCOUNTER — TELEPHONE (OUTPATIENT)
Dept: DERMATOLOGY CLINIC | Facility: CLINIC | Age: 68
End: 2023-08-04
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
## 2023-08-22 ENCOUNTER — OFFICE VISIT (OUTPATIENT)
Dept: DERMATOLOGY CLINIC | Facility: CLINIC | Age: 68
End: 2023-08-22

## 2023-08-22 DIAGNOSIS — L20.89 OTHER ATOPIC DERMATITIS: Primary | ICD-10-CM

## 2023-08-22 PROCEDURE — 99213 OFFICE O/P EST LOW 20 MIN: CPT | Performed by: PHYSICIAN ASSISTANT

## 2023-08-22 PROCEDURE — 1159F MED LIST DOCD IN RCRD: CPT | Performed by: PHYSICIAN ASSISTANT
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
## 2023-10-10 ENCOUNTER — TELEPHONE (OUTPATIENT)
Dept: DERMATOLOGY CLINIC | Facility: CLINIC | Age: 68
End: 2023-10-10

## 2023-10-10 ENCOUNTER — OFFICE VISIT (OUTPATIENT)
Dept: DERMATOLOGY CLINIC | Facility: CLINIC | Age: 68
End: 2023-10-10

## 2023-10-10 DIAGNOSIS — L20.89 OTHER ATOPIC DERMATITIS: Primary | ICD-10-CM

## 2023-10-10 PROCEDURE — 1126F AMNT PAIN NOTED NONE PRSNT: CPT | Performed by: PHYSICIAN ASSISTANT

## 2023-10-10 PROCEDURE — 1159F MED LIST DOCD IN RCRD: CPT | Performed by: PHYSICIAN ASSISTANT

## 2023-10-10 PROCEDURE — 99213 OFFICE O/P EST LOW 20 MIN: CPT | Performed by: PHYSICIAN ASSISTANT

## 2023-10-10 RX ORDER — DUPILUMAB 300 MG/2ML
1 INJECTION, SOLUTION SUBCUTANEOUS
Qty: 3.92 ML | Refills: 5 | Status: SHIPPED | OUTPATIENT
Start: 2023-10-10

## 2023-10-10 RX ORDER — PREDNISONE 5 MG/1
TABLET ORAL
Qty: 90 TABLET | Refills: 0 | Status: SHIPPED | OUTPATIENT
Start: 2023-10-10

## 2023-10-10 RX ORDER — TRIAMCINOLONE ACETONIDE 1 MG/G
1 OINTMENT TOPICAL 2 TIMES DAILY
Qty: 453.6 G | Refills: 1 | Status: SHIPPED | OUTPATIENT
Start: 2023-10-10

## 2023-10-10 RX ORDER — PREDNISONE 10 MG/1
TABLET ORAL
Qty: 90 TABLET | Refills: 0 | Status: SHIPPED | OUTPATIENT
Start: 2023-10-10

## 2023-10-10 NOTE — TELEPHONE ENCOUNTER
Called and talked to patient after discussing plan of treatment with Dr. Yumi Bedoya. We will increase his prednisone to 15 mg daily for 2 weeks. Then he will go down to 10 mg daily. He will continue with dupixent for another 3 months if no improvement then will switch medication likely to [facility] He wanted refills of his triamcinolone and mupirocin. Refills sent to pharmacy. I also sent fills for the prednisone 10 mg and 5 mg tablets. He will return in 6 week. He expressed understanding.

## 2023-10-10 NOTE — PROGRESS NOTES
HPI:    Patient ID: Sachin Wong is a 70year old male. Patient presents for follow-up on atopic dermatitis. States that he is still taking his dupxient. He is on 10 mg of prednisone daily. There was 1.5 weeks where he felt his eyes were swelling, so he did take 2 10 mg tablets. That has resolved. Will get itching here and there, so will take 10 mg at night as well for that. He is otherwise doing well. Eczema is under control. Does not take antihistamines for the itching. No draining or tendernes noted. No new areas noted. No allergies to medications noted. Review of Systems   Constitutional:  Negative for chills and fever. Musculoskeletal:  Negative for arthralgias and myalgias. Skin:  Positive for rash. Negative for color change and wound. Current Outpatient Medications   Medication Sig Dispense Refill    predniSONE 10 MG Oral Tab Take 3 tabs by mouth daily 90 tablet 0    triamcinolone 0.1 % External Ointment Apply 1 Application topically 2 (two) times daily. 453.6 g 1    Dupilumab (DUPIXENT) 300 MG/2ML Subcutaneous Solution Pen-injector Inject into the skin. atorvastatin 20 MG Oral Tab Take 1 tablet (20 mg total) by mouth nightly. 90 tablet 3    lisinopril 20 MG Oral Tab Take 1 tablet (20 mg total) by mouth daily. 90 tablet 3    levocetirizine 5 MG Oral Tab Take 1 tablet (5 mg total) by mouth every evening. 30 tablet 2    mupirocin 2 % External Ointment Apply to affect lesions tid 60 g 0    ALBUTEROL SULFATE  (90 Base) MCG/ACT Inhalation Aero Soln INHALE 2 PUFFS INTO THE LUNGS FOUR TIMES DAILY AS NEEDED FOR WHEEZING 1 each 5    cephalexin (KEFLEX) 500 MG Oral Cap Take 1 capsule (500 mg total) by mouth 2 (two) times daily. (Patient not taking: Reported on 10/10/2023) 28 capsule 0    mupirocin 2 % External Ointment Apply 1 Application topically 3 (three) times daily.  (Patient not taking: Reported on 10/10/2023) 30 g 0    predniSONE 5 MG Oral Tab 1 po every other day (Patient not taking: Reported on 10/10/2023) 30 tablet 1    triamcinolone 0.5 % External Cream Apply 1 Application topically 2 (two) times daily. (Patient not taking: Reported on 10/10/2023) 454 g 1    nystatin [id number] UNIT/GM External Cream Apply 1 Application topically every 12 (twelve) hours. (Patient not taking: Reported on 10/10/2023) 15 g 1    hydrOXYzine 25 MG Oral Tab Take 1 tablet (25 mg total) by mouth every 8 (eight) hours as needed for Itching. (Patient not taking: Reported on 10/10/2023) 30 tablet 0     Allergies:  Latex                   RASH   There were no vitals taken for this visit. There is no height or weight on file to calculate BMI. PHYSICAL EXAM:   Physical Exam  Constitutional:       General: He is not in acute distress. Appearance: Normal appearance. Skin:     General: Skin is warm and dry. Findings: Rash present. Comments: Slight eczematous areas noted on the lower legs bilaterally. No draining or tenderness noted. No scaling noted. Slight erythema noted. Neurological:      Mental Status: He is alert and oriented to person, place, and time. ASSESSMENT/PLAN:   1. Other atopic dermatitis  -After discussion with patient, advised the following:  -Continue with dupixent  -Educated to take every 14 days  -Take antihistamines for itching.   -Continue with prednisone 10 mg daily  -Let office know if he has flare ups or wants to increase his prednisone  -Return in 6 weeks.   -To call or follow-up with worsening symptoms or concerns.   -Pt was agreeable to plan and will comply with discussion above. No orders of the defined types were placed in this encounter.       Meds This Visit:  Requested Prescriptions      No prescriptions requested or ordered in this encounter       Imaging & Referrals:  None         #[id number]

## 2023-10-23 ENCOUNTER — TELEPHONE (OUTPATIENT)
Dept: DERMATOLOGY CLINIC | Facility: CLINIC | Age: 68
End: 2023-10-23

## 2023-10-23 NOTE — TELEPHONE ENCOUNTER
Pt currently receiving Dupixent through [facility]. Pt's insurance requiring a PA attempt. PA completed via phone and approved on 10/23/23 Case # [id number]. PA approved from 9/23/23 - 10/22/24. Pt's insurance states preferred filling pharmacy is [facility]. Approval letter will be sent to office. Await letter and next steps.

## 2023-11-21 ENCOUNTER — OFFICE VISIT (OUTPATIENT)
Dept: DERMATOLOGY CLINIC | Facility: CLINIC | Age: 68
End: 2023-11-21

## 2023-11-21 DIAGNOSIS — L20.89 OTHER ATOPIC DERMATITIS: Primary | ICD-10-CM

## 2023-11-21 NOTE — PROGRESS NOTES
HPI:    Patient ID: Darshan Pino is a 70year old male. Patient presents today for follow-up on dermatitis on right ankle. Patient being treated with dupixent and triamcinolone. Patient states he sees a lot of improvement. No draining or tenderness noted. Hx of allergies to medications noted. Doing well. No itching. No new flares ups noted. Currently on 10 mg of prednisone as well. Review of Systems   Constitutional:  Negative for chills and fever. Musculoskeletal:  Negative for arthralgias and myalgias. Skin:  Positive for rash. Negative for color change and wound. Current Outpatient Medications   Medication Sig Dispense Refill    Dupilumab (DUPIXENT) 300 MG/2ML Subcutaneous Solution Pen-injector Inject 1 Dose into the skin every 14 (fourteen) days. 3.92 mL 5    mupirocin 2 % External Ointment Apply 1 Application topically 3 (three) times daily. 30 g 0    predniSONE 10 MG Oral Tab Take 1 tablet daily. 90 tablet 0    predniSONE 5 MG Oral Tab Take 1 tablet daily. 90 tablet 0    triamcinolone 0.1 % External Ointment Apply 1 Application topically 2 (two) times daily. 453.6 g 1    cephalexin (KEFLEX) 500 MG Oral Cap Take 1 capsule (500 mg total) by mouth 2 (two) times daily. 28 capsule 0    atorvastatin 20 MG Oral Tab Take 1 tablet (20 mg total) by mouth nightly. 90 tablet 3    lisinopril 20 MG Oral Tab Take 1 tablet (20 mg total) by mouth daily. 90 tablet 3    triamcinolone 0.5 % External Cream Apply 1 Application topically 2 (two) times daily. 454 g 1    nystatin [id number] UNIT/GM External Cream Apply 1 Application topically every 12 (twelve) hours. 15 g 1    levocetirizine 5 MG Oral Tab Take 1 tablet (5 mg total) by mouth every evening.  30 tablet 2    mupirocin 2 % External Ointment Apply to affect lesions tid 60 g 0    ALBUTEROL SULFATE  (90 Base) MCG/ACT Inhalation Aero Soln INHALE 2 PUFFS INTO THE LUNGS FOUR TIMES DAILY AS NEEDED FOR WHEEZING 1 each 5    hydrOXYzine 25 MG Oral Tab Take 1 tablet (25 mg total) by mouth every 8 (eight) hours as needed for Itching. (Patient not taking: Reported on 11/21/2023) 30 tablet 0     Allergies: Allergies   Allergen Reactions    Latex RASH      There were no vitals taken for this visit. There is no height or weight on file to calculate BMI. PHYSICAL EXAM:   Physical Exam  Constitutional:       General: He is not in acute distress. Appearance: Normal appearance. Skin:     General: Skin is warm and dry. Findings: Rash present. Comments: Slight erythema noted on the lower right ankle. No draining or tenderness noted. No scaling noted. No flare ups noted. Neurological:      Mental Status: He is alert and oriented to person, place, and time. ASSESSMENT/PLAN:   1. Other atopic dermatitis  -After discussion with patient, advised the following:  -Continue with dupixent  -Continue with 10 mg prednisone.   -Continue with topicals as needed  -Return in 1 month  -To call or follow-up with worsening symptoms or concerns.   -Pt was agreeable to plan and will comply with discussion above. Addendum 11/22/23 10:58 p.m. Will taper after the holidays either by 1 mg each week or alternate by 7.5 mg with 10 mg when tapering. No orders of the defined types were placed in this encounter.       Meds This Visit:  Requested Prescriptions      No prescriptions requested or ordered in this encounter       Imaging & Referrals:  None         #[id number]

## 2024-01-02 ENCOUNTER — OFFICE VISIT (OUTPATIENT)
Dept: DERMATOLOGY CLINIC | Facility: CLINIC | Age: 69
End: 2024-01-02

## 2024-01-02 DIAGNOSIS — L20.89 OTHER ATOPIC DERMATITIS: Primary | ICD-10-CM

## 2024-01-02 PROCEDURE — 99213 OFFICE O/P EST LOW 20 MIN: CPT | Performed by: PHYSICIAN ASSISTANT

## 2024-01-02 PROCEDURE — 1159F MED LIST DOCD IN RCRD: CPT | Performed by: PHYSICIAN ASSISTANT

## 2024-01-02 NOTE — PROGRESS NOTES
HPI:    Patient ID: Rodri Edwards is a 68 year old male.    Patient presents for follow-up on dermatitis. Has seen improvement. Currently taking dupixent. No draining or tenderness noted. Currently taking prednisone 10 mg daily. No allergies to medications noted. No itching noted. No new flares ups noted. States his dupxient won't be covered anymore and will not be free for him.         Review of Systems   Constitutional:  Negative for chills and fever.   Musculoskeletal:  Negative for arthralgias and myalgias.   Skin:  Positive for rash. Negative for color change and wound.            Current Outpatient Medications   Medication Sig Dispense Refill    Dupilumab (DUPIXENT) 300 MG/2ML Subcutaneous Solution Pen-injector Inject 1 Dose into the skin every 14 (fourteen) days. 3.92 mL 5    mupirocin 2 % External Ointment Apply 1 Application topically 3 (three) times daily. 30 g 0    predniSONE 10 MG Oral Tab Take 1 tablet daily. 90 tablet 0    predniSONE 5 MG Oral Tab Take 1 tablet daily. 90 tablet 0    triamcinolone 0.1 % External Ointment Apply 1 Application topically 2 (two) times daily. 453.6 g 1    cephalexin (KEFLEX) 500 MG Oral Cap Take 1 capsule (500 mg total) by mouth 2 (two) times daily. 28 capsule 0    atorvastatin 20 MG Oral Tab Take 1 tablet (20 mg total) by mouth nightly. 90 tablet 3    lisinopril 20 MG Oral Tab Take 1 tablet (20 mg total) by mouth daily. 90 tablet 3    triamcinolone 0.5 % External Cream Apply 1 Application topically 2 (two) times daily. 454 g 1    nystatin [id number] UNIT/GM External Cream Apply 1 Application topically every 12 (twelve) hours. 15 g 1    levocetirizine 5 MG Oral Tab Take 1 tablet (5 mg total) by mouth every evening. 30 tablet 2    hydrOXYzine 25 MG Oral Tab Take 1 tablet (25 mg total) by mouth every 8 (eight) hours as needed for Itching. 30 tablet 0    mupirocin 2 % External Ointment Apply to affect lesions tid 60 g 0    ALBUTEROL SULFATE  (90 Base) MCG/ACT Inhalation  Aero Soln INHALE 2 PUFFS INTO THE LUNGS FOUR TIMES DAILY AS NEEDED FOR WHEEZING 1 each 5     Allergies:  Allergies   Allergen Reactions    Latex RASH      There were no vitals taken for this visit.  There is no height or weight on file to calculate BMI.  PHYSICAL EXAM:   Physical Exam  Constitutional:       General: He is not in acute distress.     Appearance: Normal appearance.   Skin:     General: Skin is warm and dry.      Findings: Rash present.      Comments: No new flare ups. No draining or tenderness noted. No erythema noted. No excoriations noted.    Neurological:      Mental Status: He is alert and oriented to person, place, and time.                ASSESSMENT/PLAN:   1. Other atopic dermatitis  -After discussion with patient, advised the following:  -Continue with dupixent if possible   -Continue with prednisone 10 mg daily   -Will consult with dermatologist to see if we need to switch biologic and if we should keep him on 10 mg prednisone.   -To call or follow-up with worsening symptoms or concerns.   -Pt was agreeable to plan and will comply with discussion above.         No orders of the defined types were placed in this encounter.      Meds This Visit:  Requested Prescriptions      No prescriptions requested or ordered in this encounter       Imaging & Referrals:  None         ID#[id number]

## 2024-01-04 ENCOUNTER — TELEPHONE (OUTPATIENT)
Dept: DERMATOLOGY CLINIC | Facility: CLINIC | Age: 69
End: 2024-01-04

## 2024-01-04 RX ORDER — PREDNISONE 2.5 MG/1
7.5 TABLET ORAL DAILY
Qty: 90 TABLET | Refills: 0 | Status: SHIPPED | OUTPATIENT
Start: 2024-01-04

## 2024-01-04 NOTE — TELEPHONE ENCOUNTER
Please see below.     Can we check on his dupixent status?    Dr. Valdez is okay with him going down to 7.5 mg alternating with 10 mg for 2 weeks. If he has no itching or flare ups when doing this, then we can do down to 7.5 mg daily. I will send 2.5 mg tablets once he has agreed and understood the plan. Should the dupixent not be an option anymore, Dr. Valdez will start an oral medication to help his eczema. Please let me know if patient is agreeable.

## 2024-01-04 NOTE — TELEPHONE ENCOUNTER
Spoke with pt and he states he is agreeable to alternating the prednisone and will give our office an update in 2 weeks.  Pt's Dupixent is approved through 10/2024 but pt is going to call [facility] to see if it is assistance for his copay.  Pt unsure of the copay amount.  Pt to cb.

## 2024-01-04 NOTE — TELEPHONE ENCOUNTER
----- Message from Irma Valdez MD sent at 1/2/2024  2:27 PM CST -----  Can try to decrease to 7.5mg alternating days with 10mg for the next 2 weeks then 7.5mg if stable. Methotrexate or another DMARD may be helpful if not able to continue with the Dupixent.  K  ----- Message -----  From: Bryan Shanks PA-C  Sent: 1/2/2024   2:01 PM CST  To: MD Dr. José Miguel Michelle Joseph looked good today. He is unable to continue with dupixent potentially as his insurance did approve the medication, but told him that if he makes more than a certain amount, which he does, then his dupixent will not be covered. He is currently on 10 mg daily of prednisone. No flare ups over the holiday. He has no itching. Triage will look into what we can do for his dupixent.     I want to keep him on 10 mg prednisone for now until we have the dupixent figured out. Though he wants to be off of it since it is making him crabby and his wife is getting upset. What do you think?    Thanks,   Bryan

## 2024-01-04 NOTE — TELEPHONE ENCOUNTER
2.5 mg tablets sent to the pharmacy. He will take 3 tabs one day and then 10 mg tablets the next day for 2 weeks. Then will update on condition and his copay status for dupixent.

## 2024-02-15 ENCOUNTER — TELEPHONE (OUTPATIENT)
Dept: DERMATOLOGY CLINIC | Facility: CLINIC | Age: 69
End: 2024-02-15

## 2024-02-15 NOTE — TELEPHONE ENCOUNTER
LOV 1/2/24 - LMTCB to triage further and for a condition update from pt.  Bryan - please advise on pt's question.  He is supposed to give us an update on his condition first, right?  Thank you.

## 2024-02-16 NOTE — TELEPHONE ENCOUNTER
Pt has contacted office with an update. He is currently taking 10mg daily and feels more controlled and no longer flaring. He will be receiving his dupixent injection. Please advise, would you like to taper? Okay to give injection, per patient last injection was about 1 month ago.

## 2024-02-23 ENCOUNTER — TELEPHONE (OUTPATIENT)
Facility: CLINIC | Age: 69
End: 2024-02-23

## 2024-02-23 NOTE — TELEPHONE ENCOUNTER
Patient outreach message received:    Recall colon in 3 years per Dr. Barraza.  Last done:6/22/21  Next due:6/22/24    Recall reminder letter mailed out to patient.

## 2024-03-11 ENCOUNTER — TELEPHONE (OUTPATIENT)
Dept: DERMATOLOGY CLINIC | Facility: CLINIC | Age: 69
End: 2024-03-11

## 2024-03-11 NOTE — TELEPHONE ENCOUNTER
May increase to 20mg ( 2 tabs a day ) for 3 days then go back to 10mg every day, take his Dupixent this weekend

## 2024-03-11 NOTE — TELEPHONE ENCOUNTER
LOV 1/2/24 - Pt states he took 2 Dupixent injections already (pt restarted after being of for a couple of months).  Pt decreased his prednisone to 7.5mg daily for about 5 days but started to get puffy eyes and itching again.  Pt then increased back up to 10mg daily for the last couple of days.  Pt asking if he should continue with the dosage?  Pt states the puffiness and itching eyes is still present.  Pt due for next Dupixent injection this weekend.  Thank you.

## 2024-04-09 ENCOUNTER — OFFICE VISIT (OUTPATIENT)
Dept: FAMILY MEDICINE CLINIC | Facility: CLINIC | Age: 69
End: 2024-04-09

## 2024-04-09 ENCOUNTER — TELEPHONE (OUTPATIENT)
Dept: FAMILY MEDICINE CLINIC | Facility: CLINIC | Age: 69
End: 2024-04-09

## 2024-04-09 ENCOUNTER — HOSPITAL ENCOUNTER (OUTPATIENT)
Dept: GENERAL RADIOLOGY | Age: 69
Discharge: HOME | End: 2024-04-09
Attending: NURSE PRACTITIONER
Payer: MEDICARE

## 2024-04-09 ENCOUNTER — TELEPHONE (OUTPATIENT)
Dept: DERMATOLOGY CLINIC | Facility: CLINIC | Age: 69
End: 2024-04-09

## 2024-04-09 VITALS
HEART RATE: 72 BPM | HEIGHT: 70 IN | BODY MASS INDEX: 28.69 KG/M2 | WEIGHT: 200.38 LBS | SYSTOLIC BLOOD PRESSURE: 127 MMHG | DIASTOLIC BLOOD PRESSURE: 84 MMHG

## 2024-04-09 DIAGNOSIS — Z79.52 ENCOUNTER FOR MONITORING OF SYSTEMIC STEROID THERAPY: Primary | ICD-10-CM

## 2024-04-09 DIAGNOSIS — M54.50 CHRONIC BILATERAL LOW BACK PAIN WITHOUT SCIATICA: ICD-10-CM

## 2024-04-09 DIAGNOSIS — G89.29 CHRONIC BILATERAL LOW BACK PAIN WITHOUT SCIATICA: Primary | ICD-10-CM

## 2024-04-09 DIAGNOSIS — Z51.81 ENCOUNTER FOR MONITORING OF SYSTEMIC STEROID THERAPY: Primary | ICD-10-CM

## 2024-04-09 DIAGNOSIS — G89.29 CHRONIC BILATERAL LOW BACK PAIN WITHOUT SCIATICA: ICD-10-CM

## 2024-04-09 DIAGNOSIS — M54.50 CHRONIC BILATERAL LOW BACK PAIN WITHOUT SCIATICA: Primary | ICD-10-CM

## 2024-04-09 PROCEDURE — 1159F MED LIST DOCD IN RCRD: CPT | Performed by: NURSE PRACTITIONER

## 2024-04-09 PROCEDURE — 1160F RVW MEDS BY RX/DR IN RCRD: CPT | Performed by: NURSE PRACTITIONER

## 2024-04-09 PROCEDURE — 72110 X-RAY EXAM L-2 SPINE 4/>VWS: CPT | Performed by: NURSE PRACTITIONER

## 2024-04-09 PROCEDURE — 1125F AMNT PAIN NOTED PAIN PRSNT: CPT | Performed by: NURSE PRACTITIONER

## 2024-04-09 PROCEDURE — 99214 OFFICE O/P EST MOD 30 MIN: CPT | Performed by: NURSE PRACTITIONER

## 2024-04-09 PROCEDURE — 3008F BODY MASS INDEX DOCD: CPT | Performed by: NURSE PRACTITIONER

## 2024-04-09 PROCEDURE — 3079F DIAST BP 80-89 MM HG: CPT | Performed by: NURSE PRACTITIONER

## 2024-04-09 PROCEDURE — 3074F SYST BP LT 130 MM HG: CPT | Performed by: NURSE PRACTITIONER

## 2024-04-09 RX ORDER — CYCLOBENZAPRINE HCL 5 MG
5 TABLET ORAL 3 TIMES DAILY PRN
Qty: 30 TABLET | Refills: 0 | Status: SHIPPED | OUTPATIENT
Start: 2024-04-09

## 2024-04-09 RX ORDER — METHYLPREDNISOLONE 4 MG/1
TABLET ORAL
Qty: 21 EACH | Refills: 0 | Status: SHIPPED | OUTPATIENT
Start: 2024-04-09

## 2024-04-09 NOTE — TELEPHONE ENCOUNTER
No fasting. The orders are still popping up as [facility], but they should be able to do at our lab- cbc, cmp and vit d level.

## 2024-04-09 NOTE — TELEPHONE ENCOUNTER
Pt seen today by Candace CALLEJAS for back pain - given medrol dosepak.     He is currently on prednisone 9mg daily.    Please confirm he should be on both.    Pt states he did inform the APRN about this, but would like you to know and confirm he should start this.

## 2024-04-09 NOTE — TELEPHONE ENCOUNTER
Ok to take both.  DEXA orders placed for future. Await xray reading done today and response to therapy.      Pt at risk for osteoporosis with his oral steroids which have been long term I would like to check labs also- please let me know if he wants at [facility] or here- last labs at [facility]  last labs last yr 6/23 ok

## 2024-04-09 NOTE — TELEPHONE ENCOUNTER
Pt informed, voiced understanding.   EM is fine. Pt would like a CB whether he needs to fast - please let us know which labs you're ordering

## 2024-04-09 NOTE — PROGRESS NOTES
HPI    Patient presents for low back pain that has been present for a significant amount of time.  Has been working on his motorcycle a lot recently and since then pain has really picked up and becoming more severe.  States that it ran out of gas yesterday so he pushed it a block.  Back seems to hurt more when he walks.  Denies radiation into legs and urinary symptoms.      Takes low dose prednisone and dupixent daily for eczema.      Review of Systems   Musculoskeletal:  Positive for back pain.        Vitals:    04/09/24 0806   BP: 127/84   Pulse: 72   Weight: 200 lb 6.4 oz (90.9 kg)   Height: 5' 10\" (1.778 m)     Body mass index is 28.75 kg/m².    Health Maintenance   Topic Date Due    Zoster Vaccines (1 of 2) Never done    Lung Cancer Screening  08/17/2022    COVID-19 Vaccine (3 - 2023-24 season) 09/01/2023    MA Annual Health Assessment  01/01/2024    Annual Depression Screening  01/01/2024    Fall Risk Screening (Annual)  01/01/2024    Colorectal Cancer Screening  06/22/2024    Tobacco Cessation Counseling 1 Year  06/15/2024    Influenza Vaccine (Season Ended) 10/01/2024    PSA  06/20/2025    Pneumococcal Vaccine: 65+ Years  Completed       Past Medical History:   Diagnosis Date    Cancer (HCC)     colon cancer    Carcinoma in situ of colon     High blood pressure     High cholesterol     Hypercholesterolemia     Lung infection 2015    Large mass S miliari    Measles     MRSA (methicillin resistant Staphylococcus aureus) 12.2009    Per [employer] is JO ANN, double check with patient    Mumps     Varicella zoster        .  Past Surgical History:   Procedure Laterality Date    Bronchoscopy with brochial  9/9/15    Colectomy  2000    Colonoscopy      Colonoscopy N/A 6/22/2021    Procedure: COLONOSCOPY with polypectomy;  Surgeon: Sid Barraza MD;  [facility]: [facility] END[facility]COPY    Foot/toes surgery proc unlisted         Family History   Problem Relation Age of Onset    Cancer Father         pancreatic    Cancer  Mother         skin    Diabetes Mother        Social History     Socioeconomic History    Marital status:      Spouse name: Not on file    Number of children: Not on file    Years of education: Not on file    Highest education level: Not on file   Occupational History    Not on file   Tobacco Use    Smoking status: Some Days     Packs/day: 1.00     Years: 40.00     Additional pack years: 0.00     Total pack years: 40.00     Types: Cigarettes     Last attempt to quit: 2015     Years since quittin.6    Smokeless tobacco: Never    Tobacco comments:     E-cig rarely   Vaping Use    Vaping Use: Never used   Substance and Sexual Activity    Alcohol use: Yes     Alcohol/week: 6.0 standard drinks of alcohol     Types: 6 Cans of beer per week     Comment: weekly    Drug use: No    Sexual activity: Not on file   Other Topics Concern     Service Not Asked    Blood Transfusions Not Asked    Caffeine Concern Yes     Comment: 6 cups coffee daily    Occupational Exposure Not Asked    Hobby Hazards Not Asked    Sleep Concern Not Asked    Stress Concern Not Asked    Weight Concern Not Asked    Special Diet Not Asked    Back Care Not Asked    Exercise Not Asked    Bike Helmet Not Asked    Seat Belt Not Asked    Self-Exams Not Asked    Grew up on a farm Not Asked    History of tanning Not Asked    Outdoor occupation Not Asked    Reaction to local anesthetic No    Pt has a pacemaker No    Pt has a defibrillator No   Social History Narrative    Live with wife        Work [employer] [address]/        Dog/Cat at home     Social Determinants of Health     Financial Resource Strain: Not on file   Food Insecurity: Not on file   Transportation Needs: Not on file   Physical Activity: Not on file   Stress: Not on file   Social Connections: Not on file   Housing Stability: Not on file       Current Outpatient Medications   Medication Sig Dispense Refill    cyclobenzaprine 5 MG Oral Tab Take 1 tablet (5 mg total) by  mouth 3 (three) times daily as needed for Muscle spasms. 30 tablet 0    methylPREDNISolone (MEDROL) 4 MG Oral Tablet Therapy Pack As directed. 21 each 0    predniSONE 5 MG Oral Tab Take 1 tablet (5 mg total) by mouth daily. 90 tablet 0    predniSONE 1 MG Oral Tab Take 4 tablets (4 mg total) by mouth daily with breakfast. As directed 360 tablet 1    Dupilumab (DUPIXENT) 300 MG/2ML Subcutaneous Solution Pen-injector Inject 1 Dose into the skin every 14 (fourteen) days. 3.92 mL 5    atorvastatin 20 MG Oral Tab Take 1 tablet (20 mg total) by mouth nightly. 90 tablet 3    lisinopril 20 MG Oral Tab Take 1 tablet (20 mg total) by mouth daily. 90 tablet 3       Allergies:  Allergies   Allergen Reactions    Latex RASH       Physical Exam  Vitals and nursing note reviewed.   Constitutional:       Appearance: Normal appearance.   Cardiovascular:      Rate and Rhythm: Normal rate and regular rhythm.      Heart sounds: Normal heart sounds.   Pulmonary:      Effort: Pulmonary effort is normal. No respiratory distress.      Breath sounds: Normal breath sounds. No stridor. No wheezing, rhonchi or rales.   Chest:      Chest wall: No tenderness.   Musculoskeletal:      Lumbar back: Spasms and tenderness present. Negative right straight leg raise test and negative left straight leg raise test.   Skin:     General: Skin is warm and dry.   Neurological:      Mental Status: He is alert and oriented to person, place, and time.          Assessment and Plan:   Problem List Items Addressed This Visit          Musculoskeletal and Injuries    Chronic bilateral low back pain without sciatica - Primary    Relevant Medications    cyclobenzaprine 5 MG Oral Tab    methylPREDNISolone (MEDROL) 4 MG Oral Tablet Therapy Pack    Other Relevant Orders    XR LUMBAR SPINE (MIN 4 VIEWS) (CPT=72110)    PHYSICAL THERAPY - INTERNAL     Lumbar xray today.  Flexeril tid prn, medrol pack as directed.  Referral to PT for assessment, treatment.      Discussed plan  of care with patient and patient is in agreement.  All questions answered. Patient to call with questions or concerns.    Encouraged to sign up for My Chart if not already registered.

## 2024-04-09 NOTE — TELEPHONE ENCOUNTER
Please inform patient not covered under plan and encourage good rx to fill script.  If not, may continue with all other supportive care we discussed.  Thanks.

## 2024-04-10 ENCOUNTER — LAB ENCOUNTER (OUTPATIENT)
Dept: LAB | Age: 69
End: 2024-04-10
Attending: DERMATOLOGY
Payer: MEDICARE

## 2024-04-10 DIAGNOSIS — Z79.52 ENCOUNTER FOR MONITORING OF SYSTEMIC STEROID THERAPY: ICD-10-CM

## 2024-04-10 DIAGNOSIS — Z51.81 ENCOUNTER FOR MONITORING OF SYSTEMIC STEROID THERAPY: ICD-10-CM

## 2024-04-10 LAB
ALBUMIN SERPL-MCNC: 4.7 G/DL (ref 3.2–4.8)
ALBUMIN/GLOB SERPL: 1.9 {RATIO} (ref 1–2)
ALP LIVER SERPL-CCNC: 70 U/L
ALT SERPL-CCNC: 20 U/L
ANION GAP SERPL CALC-SCNC: 4 MMOL/L (ref 0–18)
AST SERPL-CCNC: 26 U/L (ref ?–34)
BASOPHILS # BLD AUTO: 0.03 X10(3) UL (ref 0–0.2)
BASOPHILS NFR BLD AUTO: 0.2 %
BILIRUB SERPL-MCNC: 0.7 MG/DL (ref 0.2–1.1)
BUN BLD-MCNC: 12 MG/DL (ref 9–23)
BUN/CREAT SERPL: 11.7 (ref 10–20)
CALCIUM BLD-MCNC: 9.7 MG/DL (ref 8.7–10.4)
CHLORIDE SERPL-SCNC: 108 MMOL/L (ref 98–112)
CO2 SERPL-SCNC: 26 MMOL/L (ref 21–32)
CREAT BLD-MCNC: 1.03 MG/DL
DEPRECATED RDW RBC AUTO: 43.9 FL (ref 35.1–46.3)
EGFRCR SERPLBLD CKD-EPI 2021: 78 ML/MIN/1.73M2 (ref 60–?)
EOSINOPHIL # BLD AUTO: 0 X10(3) UL (ref 0–0.7)
EOSINOPHIL NFR BLD AUTO: 0 %
ERYTHROCYTE [DISTWIDTH] IN BLOOD BY AUTOMATED COUNT: 12.6 % (ref 11–15)
FASTING STATUS PATIENT QL REPORTED: YES
GLOBULIN PLAS-MCNC: 2.5 G/DL (ref 2.8–4.4)
GLUCOSE BLD-MCNC: 118 MG/DL (ref 70–99)
HCT VFR BLD AUTO: 46.5 %
HGB BLD-MCNC: 16.1 G/DL
IMM GRANULOCYTES # BLD AUTO: 0.12 X10(3) UL (ref 0–1)
IMM GRANULOCYTES NFR BLD: 0.8 %
LYMPHOCYTES # BLD AUTO: 1.27 X10(3) UL (ref 1–4)
LYMPHOCYTES NFR BLD AUTO: 8.1 %
MCH RBC QN AUTO: 32.9 PG (ref 26–34)
MCHC RBC AUTO-ENTMCNC: 34.6 G/DL (ref 31–37)
MCV RBC AUTO: 94.9 FL
MONOCYTES # BLD AUTO: 1.16 X10(3) UL (ref 0.1–1)
MONOCYTES NFR BLD AUTO: 7.4 %
NEUTROPHILS # BLD AUTO: 13.08 X10 (3) UL (ref 1.5–7.7)
NEUTROPHILS # BLD AUTO: 13.08 X10(3) UL (ref 1.5–7.7)
NEUTROPHILS NFR BLD AUTO: 83.5 %
OSMOLALITY SERPL CALC.SUM OF ELEC: 287 MOSM/KG (ref 275–295)
PLATELET # BLD AUTO: 226 10(3)UL (ref 150–450)
POTASSIUM SERPL-SCNC: 4.5 MMOL/L (ref 3.5–5.1)
PROT SERPL-MCNC: 7.2 G/DL (ref 5.7–8.2)
RBC # BLD AUTO: 4.9 X10(6)UL
SODIUM SERPL-SCNC: 138 MMOL/L (ref 136–145)
VIT D+METAB SERPL-MCNC: 5.6 NG/ML (ref 30–100)
WBC # BLD AUTO: 15.7 X10(3) UL (ref 4–11)

## 2024-04-10 PROCEDURE — 82306 VITAMIN D 25 HYDROXY: CPT

## 2024-04-10 PROCEDURE — 80053 COMPREHEN METABOLIC PANEL: CPT | Performed by: DERMATOLOGY

## 2024-04-10 PROCEDURE — 36415 COLL VENOUS BLD VENIPUNCTURE: CPT | Performed by: DERMATOLOGY

## 2024-04-10 PROCEDURE — 85025 COMPLETE CBC W/AUTO DIFF WBC: CPT | Performed by: DERMATOLOGY

## 2024-05-20 ENCOUNTER — PATIENT OUTREACH (OUTPATIENT)
Dept: CASE MANAGEMENT | Age: 69
End: 2024-05-20

## 2024-05-20 DIAGNOSIS — L20.89 OTHER ATOPIC DERMATITIS: ICD-10-CM

## 2024-05-20 RX ORDER — DUPILUMAB 300 MG/2ML
1 INJECTION, SOLUTION SUBCUTANEOUS
Qty: 3.92 ML | Refills: 5 | Status: CANCELLED | OUTPATIENT
Start: 2024-05-20

## 2024-05-20 NOTE — TELEPHONE ENCOUNTER
Refill Request for medication(s):     Last Office Visit: 01/02/2024    Last Refill: 10/10/23    Pharmacy, Dosage verified: yes    Condition Update (if applicable):     Rx pended and sent to provider for approval, please advise. Thank You!

## 2024-05-20 NOTE — PROCEDURES
The office order for PCP removal request is Approved and finalized on May 20, 2024.      Fatemeh CABALLERO Attributed PCP is Dr. Mustapha Banks      Thanks,  [facility] Team

## 2024-05-24 ENCOUNTER — OFFICE VISIT (OUTPATIENT)
Dept: DERMATOLOGY CLINIC | Facility: CLINIC | Age: 69
End: 2024-05-24

## 2024-05-24 DIAGNOSIS — L20.89 OTHER ATOPIC DERMATITIS: ICD-10-CM

## 2024-05-24 DIAGNOSIS — L30.9 ECZEMA, UNSPECIFIED TYPE: Primary | ICD-10-CM

## 2024-05-24 RX ORDER — DUPILUMAB 300 MG/2ML
1 INJECTION, SOLUTION SUBCUTANEOUS
Qty: 3.92 ML | Refills: 5 | Status: SHIPPED | OUTPATIENT
Start: 2024-05-24

## 2024-05-24 NOTE — PROGRESS NOTES
HPI:    Patient ID: Rodri Edwards is a 69 year old male.    Patient presents for follow-up on eczema. States he is doing well. No draining or tenderness noted. Taking dupixent every 2 weeks. Taking prednisone 9 mg daily. Did take vitamin D high dose daily instead once per week. No allergies to medications noted. No itching. No new flare ups noted. Doing well otherwise. Some itching at times at night.         Review of Systems   Constitutional:  Negative for chills and fever.   Musculoskeletal:  Negative for arthralgias and myalgias.   Skin:  Positive for rash. Negative for color change and wound.            Current Outpatient Medications   Medication Sig Dispense Refill    ergocalciferol 1.25 MG (33762 UT) Oral Cap Take 1 capsule (50,000 Units total) by mouth once a week. 12 capsule 1    cyclobenzaprine 5 MG Oral Tab Take 1 tablet (5 mg total) by mouth 3 (three) times daily as needed for Muscle spasms. 30 tablet 0    methylPREDNISolone (MEDROL) 4 MG Oral Tablet Therapy Pack As directed. 21 each 0    predniSONE 5 MG Oral Tab Take 1 tablet (5 mg total) by mouth daily. 90 tablet 0    predniSONE 1 MG Oral Tab Take 4 tablets (4 mg total) by mouth daily with breakfast. As directed 360 tablet 1    Dupilumab (DUPIXENT) 300 MG/2ML Subcutaneous Solution Pen-injector Inject 1 Dose into the skin every 14 (fourteen) days. 3.92 mL 5    atorvastatin 20 MG Oral Tab Take 1 tablet (20 mg total) by mouth nightly. 90 tablet 3    lisinopril 20 MG Oral Tab Take 1 tablet (20 mg total) by mouth daily. 90 tablet 3     Allergies:  Allergies   Allergen Reactions    Latex RASH      There were no vitals taken for this visit.  There is no height or weight on file to calculate BMI.  PHYSICAL EXAM:   Physical Exam  Constitutional:       General: He is not in acute distress.     Appearance: Normal appearance.   Skin:     General: Skin is warm and dry.      Findings: Rash present.      Comments: Slight erythema noted. No draining or tenderness  noted. No excoriations ntoed. No scaling noted.    Neurological:      Mental Status: He is alert and oriented to person, place, and time.                ASSESSMENT/PLAN:   1. Eczema, unspecified type  -After discussion with patient, advised the following:  -Continue with dupixent  -Continue with 9 mg prednisone daily   -Return in 1 month   -Continue with vitamin D  -Schcedule dexa scan   -To call or follow-up with worsening symptoms or concerns.   -Pt was agreeable to plan and will comply with discussion above.         No orders of the defined types were placed in this encounter.      Meds This Visit:  Requested Prescriptions      No prescriptions requested or ordered in this encounter       Imaging & Referrals:  None         ID#[id number]

## 2024-05-24 NOTE — TELEPHONE ENCOUNTER
Refill  Current Outpatient Medications   Medication Sig Dispense Refill                                       Dupilumab (DUPIXENT) 300 MG/2ML Subcutaneous Solution Pen-injector Inject 1 Dose into the skin every 14 (fourteen) days. 3.92 mL 5

## 2024-05-24 NOTE — TELEPHONE ENCOUNTER
Refill Request for medication(s): Dupilumab (DUPIXENT) 300 MG/2ML Subcutaneous Solution Pen-injector     Last Office Visit: 5/24/24    Last Refill:    Pharmacy, Dosage verified:     Condition Update (if applicable): PA approved from 9/23/23 - 10/22/24. Per TE 10/2023    Rx pended and sent to provider for approval, please advise. Thank You!

## 2024-06-06 ENCOUNTER — TELEPHONE (OUTPATIENT)
Dept: FAMILY MEDICINE CLINIC | Facility: CLINIC | Age: 69
End: 2024-06-06

## 2024-06-06 ENCOUNTER — OFFICE VISIT (OUTPATIENT)
Dept: FAMILY MEDICINE CLINIC | Facility: CLINIC | Age: 69
End: 2024-06-06

## 2024-06-06 VITALS
TEMPERATURE: 98 F | HEIGHT: 70 IN | HEART RATE: 71 BPM | DIASTOLIC BLOOD PRESSURE: 68 MMHG | SYSTOLIC BLOOD PRESSURE: 115 MMHG | RESPIRATION RATE: 16 BRPM | BODY MASS INDEX: 27.49 KG/M2 | WEIGHT: 192 LBS

## 2024-06-06 DIAGNOSIS — G89.29 CHRONIC LEFT-SIDED LOW BACK PAIN WITH LEFT-SIDED SCIATICA: Primary | ICD-10-CM

## 2024-06-06 DIAGNOSIS — M54.42 CHRONIC LEFT-SIDED LOW BACK PAIN WITH LEFT-SIDED SCIATICA: Primary | ICD-10-CM

## 2024-06-06 PROCEDURE — 1170F FXNL STATUS ASSESSED: CPT | Performed by: FAMILY MEDICINE

## 2024-06-06 PROCEDURE — 1159F MED LIST DOCD IN RCRD: CPT | Performed by: FAMILY MEDICINE

## 2024-06-06 PROCEDURE — 1160F RVW MEDS BY RX/DR IN RCRD: CPT | Performed by: FAMILY MEDICINE

## 2024-06-06 PROCEDURE — 99213 OFFICE O/P EST LOW 20 MIN: CPT | Performed by: FAMILY MEDICINE

## 2024-06-06 PROCEDURE — 3074F SYST BP LT 130 MM HG: CPT | Performed by: FAMILY MEDICINE

## 2024-06-06 PROCEDURE — 3008F BODY MASS INDEX DOCD: CPT | Performed by: FAMILY MEDICINE

## 2024-06-06 PROCEDURE — 1125F AMNT PAIN NOTED PAIN PRSNT: CPT | Performed by: FAMILY MEDICINE

## 2024-06-06 PROCEDURE — 3078F DIAST BP <80 MM HG: CPT | Performed by: FAMILY MEDICINE

## 2024-06-06 RX ORDER — CYCLOBENZAPRINE HCL 5 MG
5 TABLET ORAL NIGHTLY PRN
Qty: 15 TABLET | Refills: 0 | Status: SHIPPED | OUTPATIENT
Start: 2024-06-06

## 2024-06-06 NOTE — TELEPHONE ENCOUNTER
View all authorizations for this medication  Closed   6/6/2024 11:32 AM  Close reason: Prior Authorization duplicate/in process   Note from payer: PA was already submitted for this patient and drug which was denied.;CaseId:[id number];Status:Denied;Appeal Information: Attention:ATTN: MEDICARE CLINICAL APPEALS [employer] [facility],[place] Phone:[phone] WebAddress:[email or web address];   Payer: [employer] [place] DELIVERY    [phone]

## 2024-06-06 NOTE — PROGRESS NOTES
Subjective:   Patient ID: Rodri Edwards is a 69 year old male.    Pt has had some hx of back pains and has had x-rays done last month with Candace Estes. Pt has had shooting pain down his left hip/ leg at times. No trauma or injuries. Was given order for PT but unable to schedule appt.  No fevers.   Pt has hx of skin condition. Pt is on prednisone and dupixent now.        History/Other:   Review of Systems   Constitutional:  Negative for fever.   Musculoskeletal:  Positive for back pain and myalgias.     Current Outpatient Medications   Medication Sig Dispense Refill    cyclobenzaprine 5 MG Oral Tab Take 1 tablet (5 mg total) by mouth nightly as needed for Muscle spasms. 15 tablet 0    Dupilumab (DUPIXENT) 300 MG/2ML Subcutaneous Solution Pen-injector Inject 1 Dose into the skin every 14 (fourteen) days. 3.92 mL 5    ergocalciferol 1.25 MG (91071 UT) Oral Cap Take 1 capsule (50,000 Units total) by mouth once a week. 12 capsule 1    methylPREDNISolone (MEDROL) 4 MG Oral Tablet Therapy Pack As directed. 21 each 0    predniSONE 5 MG Oral Tab Take 1 tablet (5 mg total) by mouth daily. 90 tablet 0    predniSONE 1 MG Oral Tab Take 4 tablets (4 mg total) by mouth daily with breakfast. As directed 360 tablet 1    atorvastatin 20 MG Oral Tab Take 1 tablet (20 mg total) by mouth nightly. 90 tablet 3    lisinopril 20 MG Oral Tab Take 1 tablet (20 mg total) by mouth daily. 90 tablet 3     Allergies:  Allergies   Allergen Reactions    Latex RASH       Objective:   Physical Exam  Constitutional:       Appearance: Normal appearance.   Musculoskeletal:      Lumbar back: Normal. No bony tenderness. Normal range of motion. Negative right straight leg raise test and negative left straight leg raise test.      Comments: No sig pains on exam. Some pains with movement.   Neurological:      General: No focal deficit present.      Mental Status: He is alert and oriented to person, place, and time.      Sensory: No sensory deficit.       Deep Tendon Reflexes: Reflexes normal.         Assessment & Plan:   1. Chronic left-sided low back pain with left-sided sciatica: reviewed recent x-ray results  - After discussion with patient, flexeril as discussed; To call if worse or any sig symptoms; can to PT as previously prescribed.  Consider follow up with physiatry as discussed also.          No orders of the defined types were placed in this encounter.      Meds This Visit:  Requested Prescriptions     Signed Prescriptions Disp Refills    cyclobenzaprine 5 MG Oral Tab 15 tablet 0     Sig: Take 1 tablet (5 mg total) by mouth nightly as needed for Muscle spasms.       Imaging & Referrals:  PHYSIATRY - INTERNAL

## 2024-06-06 NOTE — TELEPHONE ENCOUNTER
Reina Acosta, [facility]    4/10/24 10:59 AM  Note  Cyclobenzaprine is denied  patient has been informed and will try [facility].

## 2024-06-08 DIAGNOSIS — E78.2 MIXED HYPERLIPIDEMIA: ICD-10-CM

## 2024-06-08 DIAGNOSIS — I10 ESSENTIAL HYPERTENSION: ICD-10-CM

## 2024-06-12 RX ORDER — ATORVASTATIN CALCIUM 20 MG/1
20 TABLET, FILM COATED ORAL NIGHTLY
Qty: 90 TABLET | Refills: 3 | Status: SHIPPED | OUTPATIENT
Start: 2024-06-12

## 2024-06-12 RX ORDER — LISINOPRIL 20 MG/1
20 TABLET ORAL DAILY
Qty: 90 TABLET | Refills: 3 | Status: SHIPPED | OUTPATIENT
Start: 2024-06-12

## 2024-06-12 NOTE — TELEPHONE ENCOUNTER
REFILL PASSED PER [facility] PROTOCOLS    Requested Prescriptions   Pending Prescriptions Disp Refills    ATORVASTATIN 20 MG Oral Tab [Pharmacy Med Name: ATORVASTATIN 20MG TABLETS] 90 tablet 3     Sig: TAKE 1 TABLET(20 MG) BY MOUTH EVERY NIGHT       Cholesterol Medication Protocol Passed - 6/8/2024 12:16 PM        Passed - ALT < 80     Lab Results   Component Value Date    ALT 20 04/10/2024             Passed - ALT resulted within past year        Passed - Lipid panel within past 12 months     Lab Results   Component Value Date    CHOLEST 113 06/20/2023    TRIG 77 06/20/2023    HDL 51 06/20/2023    LDL 46 06/20/2023    VLDL 11 06/20/2023    TCHDLRATIO 2.9 04/04/2022    NONHDLC 62 06/20/2023             Passed - In person appointment or virtual visit in the past 12 mos or appointment in next 3 mos     Recent Outpatient Visits              6 days ago Chronic left-sided low back pain with left-sided sciatica    [facility], [place] Anselmo Solis MD    Office Visit    2 weeks ago Eczema, unspecified type    [facility] [place]    Office Visit    2 months ago Chronic bilateral low back pain without sciatica    [facility], Candace Rainey APRN    Office Visit    5 months ago Other atopic dermatitis    [facility], Bryan Loera PA-C    Office Visit    6 months ago Other atopic dermatitis    [facility]    Office Visit          Future Appointments         Provider Department Appt Notes    In 3 weeks ADO HARPREET RM1; ADO DEXA RM1 [facility] DEXA - [place] Order in Epic                      LISINOPRIL 20 MG Oral Tab [Pharmacy Med Name: LISINOPRIL 20MG TABLETS] 90 tablet 3     Sig: TAKE 1 TABLET(20 MG) BY MOUTH DAILY       Hypertension Medications Protocol Passed - 6/8/2024 12:16  PM        Passed - CMP or BMP in past 12 months        Passed - Last BP reading less than 140/90     BP Readings from Last 1 Encounters:   06/06/24 115/68               Passed - In person appointment or virtual visit in the past 12 mos or appointment in next 3 mos     Recent Outpatient Visits              6 days ago Chronic left-sided low back pain with left-sided sciatica    [facility]    Office Visit    2 weeks ago Eczema, unspecified type    [facility]    Office Visit    2 months ago Chronic bilateral low back pain without sciatica    [facility] Candace Rainey APRN    Office Visit    5 months ago Other atopic dermatitis    [facility]    Office Visit    6 months ago Other atopic dermatitis    [facility]    Office Visit          Future Appointments         Provider Department Appt Notes    In 3 weeks ADO [facility] RM1; ADO DEXA RM1 [facility] DEXA - [place] Order in Epic                    Passed - EGFRCR or GFRNAA > 50     GFR Evaluation  EGFRCR: 78 , resulted on 4/10/2024               Future Appointments         [facility]t [facility]    [facility] DEXA - [place] Order in [facility]          Recent Outpatient Visits              6 days ago Chronic left-sided low back pain with left-sided sciatica    [facility]    Office Visit    2 weeks ago Eczema, unspecified type    [facility]    Office Visit    2 months ago Chronic bilateral low back pain without sciatica    [facility] [address], Candace Rainey APRN    Office  Visit    5 months ago Other atopic dermatitis    [facility], Bryan Loera PA-C    Office Visit    6 months ago Other atopic dermatitis    [facility], Bryan Loera PA-C    Office Visit

## 2024-06-25 ENCOUNTER — NURSE TRIAGE (OUTPATIENT)
Dept: FAMILY MEDICINE CLINIC | Facility: CLINIC | Age: 69
End: 2024-06-25

## 2024-06-25 NOTE — TELEPHONE ENCOUNTER
Action Requested: Summary for Provider     []  Critical Lab, Recommendations Needed  [] Need Additional Advice  []   FYI    []   Need Orders  [] Need Medications Sent to Pharmacy  []  Other     SUMMARY:    appointment made for June 27 at 9:00 am which was the first available.    The patient stated for about 1 week has been having left intermittent thigh pain.  At rest 0/10  while walking can go up to  5-6/10.   When standing he feels a muscle ache.  At times his toes tingle.  Denies change in color, temperature, injury, open areas.  Denies chest pains, shortness of breath.    Instructed if pain becomes constant needs to proceed to the Emergency room with understanding.    Reason for call: Leg Pain  Onset: Data Unavailable      General Assessment (questions to ask the caller)  Do you consider this a medical emergency?: No  Can you access 911?: Yes  Do you have any pain?: Yes  What is the severity of your pain? (Scale 1-10): 6  Do you have a fever?: No  What is the date of your last medical exam?: 06/06/24  Additional Assessments  Are these symptoms new, recurrent, or chronic?: new (started about one week ago)        Reason for Disposition   Leg pain which occurs after walking a certain distance and disappears with rest, AND age > 50   MODERATE pain (e.g., interferes with normal activities, limping) and present > 3 days    Protocols used: Leg Pain-A-[place]

## 2024-06-27 ENCOUNTER — OFFICE VISIT (OUTPATIENT)
Dept: FAMILY MEDICINE CLINIC | Facility: CLINIC | Age: 69
End: 2024-06-27

## 2024-06-27 VITALS
HEIGHT: 70 IN | SYSTOLIC BLOOD PRESSURE: 136 MMHG | DIASTOLIC BLOOD PRESSURE: 82 MMHG | BODY MASS INDEX: 27.06 KG/M2 | HEART RATE: 70 BPM | WEIGHT: 189 LBS

## 2024-06-27 DIAGNOSIS — G62.9 NEUROPATHY: Primary | ICD-10-CM

## 2024-06-27 PROCEDURE — 99213 OFFICE O/P EST LOW 20 MIN: CPT | Performed by: PHYSICIAN ASSISTANT

## 2024-06-27 PROCEDURE — 1126F AMNT PAIN NOTED NONE PRSNT: CPT | Performed by: PHYSICIAN ASSISTANT

## 2024-06-27 PROCEDURE — 3079F DIAST BP 80-89 MM HG: CPT | Performed by: PHYSICIAN ASSISTANT

## 2024-06-27 PROCEDURE — 3075F SYST BP GE 130 - 139MM HG: CPT | Performed by: PHYSICIAN ASSISTANT

## 2024-06-27 PROCEDURE — 1159F MED LIST DOCD IN RCRD: CPT | Performed by: PHYSICIAN ASSISTANT

## 2024-06-27 PROCEDURE — 3008F BODY MASS INDEX DOCD: CPT | Performed by: PHYSICIAN ASSISTANT

## 2024-06-27 RX ORDER — GABAPENTIN 100 MG/1
100 CAPSULE ORAL NIGHTLY
Qty: 30 CAPSULE | Refills: 0 | Status: SHIPPED | OUTPATIENT
Start: 2024-06-27

## 2024-06-27 NOTE — PROGRESS NOTES
HPI:     HPI  A 69-year-old male is in the office complaining of intermittent left thigh pain for the past week. The patient feels a tingling sensation sometimes.   The patient denies fever, weakness, redness, or swelling.    Medications:     Current Outpatient Medications   Medication Sig Dispense Refill    gabapentin 100 MG Oral Cap Take 1 capsule (100 mg total) by mouth nightly. 30 capsule 0    atorvastatin 20 MG Oral Tab Take 1 tablet (20 mg total) by mouth nightly. 90 tablet 3    lisinopril 20 MG Oral Tab Take 1 tablet (20 mg total) by mouth daily. 90 tablet 3    cyclobenzaprine 5 MG Oral Tab Take 1 tablet (5 mg total) by mouth nightly as needed for Muscle spasms. 15 tablet 0    Dupilumab (DUPIXENT) 300 MG/2ML Subcutaneous Solution Pen-injector Inject 1 Dose into the skin every 14 (fourteen) days. 3.92 mL 5    ergocalciferol 1.25 MG (09815 UT) Oral Cap Take 1 capsule (50,000 Units total) by mouth once a week. 12 capsule 1    predniSONE 5 MG Oral Tab Take 1 tablet (5 mg total) by mouth daily. 90 tablet 0    predniSONE 1 MG Oral Tab Take 4 tablets (4 mg total) by mouth daily with breakfast. As directed 360 tablet 1       Allergies:     Allergies   Allergen Reactions    Latex RASH       History:     Health Maintenance   Topic Date Due    Zoster Vaccines (1 of 2) Never done    Lung Cancer Screening  08/17/2022    COVID-19 Vaccine (3 - 2023-24 season) 09/01/2023    MA Annual Health Assessment  01/01/2024    Tobacco Cessation Counseling  01/01/2024    Colorectal Cancer Screening  06/22/2024    Influenza Vaccine (Season Ended) 10/01/2024    PSA  06/20/2025    Annual Depression Screening  Completed    Fall Risk Screening (Annual)  Completed    Pneumococcal Vaccine: 65+ Years  Completed       No LMP for male patient.   Past Medical History:     Past Medical History:    Cancer (HCC)    colon cancer    Carcinoma in situ of colon    High blood pressure    High cholesterol    Hypercholesterolemia    Lung infection    Large  mass S miliari    Measles    MRSA (methicillin resistant Staphylococcus aureus)    Per [employer] is JO ANN, double check with patient    Mumps    Varicella zoster       Past Surgical History:     Past Surgical History:   Procedure Laterality Date    Bronchoscopy with brochial  9/9/15    Colectomy      Colonoscopy      Colonoscopy N/A 2021    Procedure: COLONOSCOPY with polypectomy;  Surgeon: Sid Barraza MD;  Location: [facility] ENDOSCOPY    Foot/toes surgery proc unlisted         Family History:     Family History   Problem Relation Age of Onset    Cancer Father         pancreatic    Cancer Mother         skin    Diabetes Mother        Social History:     Social History     Socioeconomic History    Marital status:      Spouse name: Not on file    Number of children: Not on file    Years of education: Not on file    Highest education level: Not on file   Occupational History    Not on file   Tobacco Use    Smoking status: Some Days     Current packs/day: 0.00     Average packs/day: 1 pack/day for 40.0 years (40.0 ttl pk-yrs)     Types: Cigarettes     Start date: 1975     Last attempt to quit: 2015     Years since quittin.8     Passive exposure: Current    Smokeless tobacco: Never    Tobacco comments:     E-cig rarely   Vaping Use    Vaping status: Never Used   Substance and Sexual Activity    Alcohol use: Yes     Alcohol/week: 6.0 standard drinks of alcohol     Types: 6 Cans of beer per week     Comment: weekly    Drug use: No    Sexual activity: Not on file   Other Topics Concern     Service Not Asked    Blood Transfusions Not Asked    Caffeine Concern Yes     Comment: 6 cups coffee daily    Occupational Exposure Not Asked    Hobby Hazards Not Asked    Sleep Concern Not Asked    Stress Concern Not Asked    Weight Concern Not Asked    Special Diet Not Asked    Back Care Not Asked    Exercise Not Asked    Bike Helmet Not Asked    Seat Belt Not Asked    Self-Exams Not Asked     Grew up on a farm Not Asked    History of tanning Not Asked    Outdoor occupation Not Asked    Reaction to local anesthetic No    Pt has a pacemaker No    Pt has a defibrillator No   Social History Narrative    Live with wife        Work [employer]/        Dog/Cat at home     Social Determinants of Health     Financial Resource Strain: Not on file   Food Insecurity: Not on file   Transportation Needs: Not on file   Physical Activity: Not on file   Stress: Not on file   Social Connections: Not on file   Housing Stability: Not on file       Review of Systems:   Review of Systems   + left thigh pain    Vitals:    06/27/24 0844 06/27/24 0903   BP: 144/84 136/82   Pulse: 70    Weight: 189 lb (85.7 kg)    Height: 5' 10\" (1.778 m)      Body mass index is 27.12 kg/m².    Physical Exam:   Physical Exam  Vitals reviewed.      Bilateral thigh: no swelling, no erythema, no tenderness to palpation.    Assessment and Plan::     Problem List Items Addressed This Visit    None  Visit Diagnoses       Neuropathy    -  Primary    Relevant Medications    gabapentin 100 MG Oral Cap        Start Gabapentin 100 mg at bedtime.    Discussed plan of care with pt and pt is in agreement.All questions answered. Pt to call with questions or concerns.

## 2024-07-08 ENCOUNTER — HOSPITAL ENCOUNTER (OUTPATIENT)
Dept: BONE DENSITY | Age: 69
Discharge: HOME | End: 2024-07-08
Attending: DERMATOLOGY
Payer: MEDICARE

## 2024-07-08 DIAGNOSIS — Z79.52 ENCOUNTER FOR MONITORING OF SYSTEMIC STEROID THERAPY: ICD-10-CM

## 2024-07-08 DIAGNOSIS — Z51.81 ENCOUNTER FOR MONITORING OF SYSTEMIC STEROID THERAPY: ICD-10-CM

## 2024-07-08 PROCEDURE — 77080 DXA BONE DENSITY AXIAL: CPT | Performed by: DERMATOLOGY

## 2024-09-25 ENCOUNTER — TELEPHONE (OUTPATIENT)
Dept: FAMILY MEDICINE CLINIC | Facility: CLINIC | Age: 69
End: 2024-09-25

## 2024-09-30 ENCOUNTER — TELEPHONE (OUTPATIENT)
Facility: CLINIC | Age: 69
End: 2024-09-30

## 2024-09-30 NOTE — TELEPHONE ENCOUNTER
Maddy states she is scheduled with Gastroenterologist for 10/8/2024 colonoscopy and wanted to know if patient can take that date for procedure.  Please call.  Thank you.

## 2024-10-03 ENCOUNTER — TELEPHONE (OUTPATIENT)
Facility: CLINIC | Age: 69
End: 2024-10-03

## 2024-10-03 NOTE — TELEPHONE ENCOUNTER
Patient's spouse calling states preparation has not been sent yet for the patient. Please call patient.     (Bety on file in [place], [place])

## 2024-10-03 NOTE — TELEPHONE ENCOUNTER
I spoke with patient's wife and advised her the Miralax prep instructions were sent to [employer] on 10/01/2024.  Wife voiced understanding and states she will review instructions.  No further questions at this time.      Sid Barraza MD         9/30/24 11:05 AM  Note     May schedule a colonoscopy for a personal history of colon polyps and a family history of colon cancer following a split dose MiraLAX/Gatorade preparation and monitored anesthesia care.  A concurrent upper endoscopy to exclude polyps in the stomach or small intestine based on the history of multiple polyps and family history would be reasonable.  May schedule both procedures if the patient wishes to do so.

## 2024-10-08 ENCOUNTER — ANESTHESIA (OUTPATIENT)
Dept: ENDOSCOPY | Facility: HOSPITAL | Age: 69
End: 2024-10-08
Payer: MEDICARE

## 2024-10-08 ENCOUNTER — HOSPITAL ENCOUNTER (OUTPATIENT)
Facility: HOSPITAL | Age: 69
Setting detail: HOSPITAL OUTPATIENT SURGERY
Discharge: HOME | End: 2024-10-08
Attending: INTERNAL MEDICINE | Admitting: INTERNAL MEDICINE
Payer: MEDICARE

## 2024-10-08 ENCOUNTER — ANESTHESIA EVENT (OUTPATIENT)
Dept: ENDOSCOPY | Facility: HOSPITAL | Age: 69
End: 2024-10-08
Payer: MEDICARE

## 2024-10-08 VITALS
OXYGEN SATURATION: 97 % | SYSTOLIC BLOOD PRESSURE: 110 MMHG | HEART RATE: 73 BPM | BODY MASS INDEX: 25.2 KG/M2 | TEMPERATURE: 98 F | WEIGHT: 180 LBS | RESPIRATION RATE: 15 BRPM | HEIGHT: 71 IN | DIASTOLIC BLOOD PRESSURE: 62 MMHG

## 2024-10-08 DIAGNOSIS — Z80.0 FAMILY HISTORY OF MALIGNANT NEOPLASM OF GASTROINTESTINAL TRACT: ICD-10-CM

## 2024-10-08 DIAGNOSIS — Z86.0100 HX OF COLONIC POLYPS: ICD-10-CM

## 2024-10-08 PROCEDURE — 0DBM8ZX EXCISION OF DESCENDING COLON, VIA NATURAL OR ARTIFICIAL OPENING ENDOSCOPIC, DIAGNOSTIC: ICD-10-PCS | Performed by: INTERNAL MEDICINE

## 2024-10-08 PROCEDURE — 0DBN8ZX EXCISION OF SIGMOID COLON, VIA NATURAL OR ARTIFICIAL OPENING ENDOSCOPIC, DIAGNOSTIC: ICD-10-PCS | Performed by: INTERNAL MEDICINE

## 2024-10-08 PROCEDURE — 0DBL8ZX EXCISION OF TRANSVERSE COLON, VIA NATURAL OR ARTIFICIAL OPENING ENDOSCOPIC, DIAGNOSTIC: ICD-10-PCS | Performed by: INTERNAL MEDICINE

## 2024-10-08 PROCEDURE — 43239 EGD BIOPSY SINGLE/MULTIPLE: CPT | Performed by: INTERNAL MEDICINE

## 2024-10-08 PROCEDURE — 45385 COLONOSCOPY W/LESION REMOVAL: CPT | Performed by: INTERNAL MEDICINE

## 2024-10-08 PROCEDURE — 0DB78ZX EXCISION OF STOMACH, PYLORUS, VIA NATURAL OR ARTIFICIAL OPENING ENDOSCOPIC, DIAGNOSTIC: ICD-10-PCS | Performed by: INTERNAL MEDICINE

## 2024-10-08 RX ORDER — NALOXONE HYDROCHLORIDE 0.4 MG/ML
0.08 INJECTION, SOLUTION INTRAMUSCULAR; INTRAVENOUS; SUBCUTANEOUS ONCE AS NEEDED
Status: DISCONTINUED | OUTPATIENT
Start: 2024-10-08 | End: 2024-10-08

## 2024-10-08 RX ORDER — LIDOCAINE HYDROCHLORIDE 10 MG/ML
INJECTION, SOLUTION EPIDURAL; INFILTRATION; INTRACAUDAL; PERINEURAL AS NEEDED
Status: DISCONTINUED | OUTPATIENT
Start: 2024-10-08 | End: 2024-10-08 | Stop reason: SURG

## 2024-10-08 RX ORDER — SODIUM CHLORIDE, SODIUM LACTATE, POTASSIUM CHLORIDE, CALCIUM CHLORIDE 600; 310; 30; 20 MG/100ML; MG/100ML; MG/100ML; MG/100ML
INJECTION, SOLUTION INTRAVENOUS CONTINUOUS
Status: DISCONTINUED | OUTPATIENT
Start: 2024-10-08 | End: 2024-10-08

## 2024-10-08 RX ORDER — EPHEDRINE SULFATE 50 MG/ML
INJECTION INTRAVENOUS AS NEEDED
Status: DISCONTINUED | OUTPATIENT
Start: 2024-10-08 | End: 2024-10-08 | Stop reason: SURG

## 2024-10-08 RX ADMIN — SODIUM CHLORIDE, SODIUM LACTATE, POTASSIUM CHLORIDE, CALCIUM CHLORIDE: 600; 310; 30; 20 INJECTION, SOLUTION INTRAVENOUS at 10:04:00

## 2024-10-08 RX ADMIN — EPHEDRINE SULFATE 10 MG: 50 INJECTION INTRAVENOUS at 09:56:00

## 2024-10-08 RX ADMIN — SODIUM CHLORIDE, SODIUM LACTATE, POTASSIUM CHLORIDE, CALCIUM CHLORIDE: 600; 310; 30; 20 INJECTION, SOLUTION INTRAVENOUS at 09:53:00

## 2024-10-08 RX ADMIN — LIDOCAINE HYDROCHLORIDE 30 MG: 10 INJECTION, SOLUTION EPIDURAL; INFILTRATION; INTRACAUDAL; PERINEURAL at 09:54:00

## 2024-10-08 RX ADMIN — SODIUM CHLORIDE, SODIUM LACTATE, POTASSIUM CHLORIDE, CALCIUM CHLORIDE: 600; 310; 30; 20 INJECTION, SOLUTION INTRAVENOUS at 08:51:00

## 2024-10-08 RX ADMIN — EPHEDRINE SULFATE 10 MG: 50 INJECTION INTRAVENOUS at 09:15:00

## 2024-10-08 RX ADMIN — EPHEDRINE SULFATE 10 MG: 50 INJECTION INTRAVENOUS at 09:01:00

## 2024-10-08 RX ADMIN — LIDOCAINE HYDROCHLORIDE 30 MG: 10 INJECTION, SOLUTION EPIDURAL; INFILTRATION; INTRACAUDAL; PERINEURAL at 08:52:00

## 2024-10-08 NOTE — OPERATIVE REPORT
[facility] Endoscopy Report      Date of Procedure:  10/08/24      Preoperative Diagnosis:  1.  Personal history of colon cancer  2.  Personal history of adenomatous colon polyps  3.  Family history of colon cancer and other gastrointestinal malignancies      Postoperative Diagnosis:  1.  Multiple colon polyps  2.  Sigmoid colon diverticulosis  3.  Gastric antral erosions/superficial ulceration  4.  Diminutive gastric polyps      Procedure:    Colonoscopy with polypectomy  Esophagogastroduodenoscopy with biopsy      Surgeon:  Sid Barraza M.D.      Anesthesia:  Monitored anesthesia care  Cecal withdrawal time: 44 minutes  EBL:  Insignificant      Brief History:  This is a 69 year old male who presents for a surveillance colonoscopy in the setting of a personal history of a stage II right colon cancer resected in 1998 (fourth decade), a personal history of multiple adenomatous colon polyps and a family history of colon and other gastrointestinal malignancies.  The patient's last colonoscopy was a little over 3 years prior with removal of #8-9 subcentimeter tubular adenomas.  Other than occasional constipation the patient is asymptomatic from a lower gastrointestinal tract standpoint.  Based on the personal and family history, a concurrent upper endoscopy is being performed to evaluate for upper gastrointestinal tract polyps.      Technique:  After informed consent, the patient was placed in the left lateral recumbent position.  Digital rectal examination revealed no palpable intraluminal abnormalities.  An Olympus variable stiffness 190 series HD colonoscope was inserted into the rectum and advanced under direct vision by following the lumen to the patient's ileocolonic anastomosis and into the sonido-terminal ileum.  The colon was examined upon withdrawal in the left lateral recumbent position.    Following colonoscopy, an Olympus adult HD gastroscope was inserted into the hypopharynx and  advanced under direct vision into the esophagus, stomach and duodenum.  The endoscope was withdrawn to the stomach where retroflexion of the angulus, body, cardia and fundus was performed.  The instrument was straightened, insufflated air and fluid were suctioned and the endoscope was withdrawn.  The procedures were well tolerated without immediate complication.      Findings:  The preparation of the colon was only fair.  There was granular and lacy bile-stained material scattered throughout the colon that required extensive irrigation and suctioning.  Reasonably good visualization was achieved.  There was evidence of a prior right colon resection with a widely patent ileocolonic anastomosis.  The sonido-terminal ileum was examined for several centimeters and visually normal..  The visualized colonic mucosa from the anastomosis to the anal verge was normal with an intact vascular pattern.  There were #7 polyps seen within the colon which were removed as follows:    1.  In the proximal transverse colon near the ileocolonic anastomosis there was a subtle 1 cm sessile polyp which was piecemeal cold snare excised and retrieved.  2.  In the transverse colon there were #4 3-7 mm sessile polyps which were cold snare excised and retrieved.  3.  In the descending colon there was a 3 mm sessile polyp which was cold snare excised and retrieved.  4.  In the mid sigmoid colon there was a 6-7 mm sessile polyp which was cold snare excised and retrieved.    Inspection of all sites revealed no evidence of ongoing bleeding.  There were at least a few slitlike diverticula seen in the sigmoid colon without signs of complication..  There were no other colonic polyps, mass lesions, vascular anomalies or signs of inflammation seen.  Retroflexion in the rectum revealed no abnormalities.    The esophagus was normal without evidence of ulceration, erosion, stricture, ring or Cronin's esophagus.  The GE junction and diaphragmatic impression  were at 37-38 cm.  No definite hiatal hernia was seen on this study.  The stomach distended appropriately with insufflated air.  The mucosa of the stomach including cardia, fundus, gastric body and antrum was normal with the exception of a few 3 mm pale cloudlike polyps along the proximal greater curvature which were biopsied and punctate erosions along with a 7-8 mm serpiginous superficial ulceration in the antrum.  Biopsies from the the erosions and ulceration were obtained.  The duodenal bulb was normal.  There is a prominent soft normal appearing fold in the proximal second portion of the duodenum.  Views of the ampulla were not achievable with the forward-viewing endoscope.        Impression:  1.  Multiple colon polyps  2.  Uncomplicated sigmoid colon diverticulosis  3.  Gastric antral erosion/superficial ulcerations  4.  Diminutive gastric polyps likely fundic gland in nature    Recommendations:  1.  Follow-up biopsy results.  2.  Query regarding previous personal and family genetic testing.  3.  Query regarding aspirin/NSAID use.  4.  Further recommendations pending biopsy and clinical course.          Sid Barraza MD  10/8/2024  10:07 AM

## 2024-10-08 NOTE — H&P
History & Physical Examination    Patient Name: Rodri Edwards  MRN: [id number]  [facility]: [id number]  YOB: 1955    Diagnosis: Personal history of colon cancer, personal history of adenomatous colon polyps, family history of colon cancer and other gastrointestinal malignancy      Medications Prior to Admission   Medication Sig Dispense Refill Last Dose    gabapentin 100 MG Oral Cap Take 1 capsule (100 mg total) by mouth nightly. 30 capsule 0     atorvastatin 20 MG Oral Tab Take 1 tablet (20 mg total) by mouth nightly. 90 tablet 3 10/6/2024    lisinopril 20 MG Oral Tab Take 1 tablet (20 mg total) by mouth daily. 90 tablet 3 10/6/2024    Dupilumab (DUPIXENT) 300 MG/2ML Subcutaneous Solution Pen-injector Inject 1 Dose into the skin every 14 (fourteen) days. 3.92 mL 5 9/30/2024    ergocalciferol 1.25 MG (81477 UT) Oral Cap Take 1 capsule (50,000 Units total) by mouth once a week. 12 capsule 1 10/5/2024    ergocalciferol 1.25 MG (88527 UT) Oral Cap Take 1 capsule (50,000 Units total) by mouth once a week. 12 capsule 1     cyclobenzaprine 5 MG Oral Tab Take 1 tablet (5 mg total) by mouth nightly as needed for Muscle spasms. (Patient not taking: Reported on 10/5/2024) 15 tablet 0 Not Taking    predniSONE 5 MG Oral Tab Take 1 tablet (5 mg total) by mouth daily. (Patient not taking: Reported on 10/5/2024) 90 tablet 0 Not Taking    predniSONE 1 MG Oral Tab Take 4 tablets (4 mg total) by mouth daily with breakfast. As directed (Patient not taking: Reported on 10/5/2024) 360 tablet 1 Not Taking     Current Facility-Administered Medications   Medication Dose Route Frequency    lactated ringers infusion   Intravenous Continuous       Allergies:   Allergies   Allergen Reactions    Latex RASH       Past Medical History:    Cancer (HCC)    colon cancer    Carcinoma in situ of colon    Disorder of liver    High blood pressure    High cholesterol    Hypercholesterolemia    Lung infection    Large mass S miliari    Measles     MRSA (methicillin resistant Staphylococcus aureus)    Per [employer] is JO ANN, double check with patient    Mumps    Pulmonary emphysema (HCC)    Varicella zoster     Past Surgical History:   Procedure Laterality Date    Bronchoscopy with brochial  2015    Colectomy  2000    Colonoscopy      Colonoscopy N/A 2021    Procedure: COLONOSCOPY with polypectomy;  Surgeon: Sid Barraza MD;  Location: [facility] ENDOSCOPY    Colonoscopy      Foot/toes surgery proc unlisted Left     Left great toe infection     Family History   Problem Relation Age of Onset    Cancer Father         pancreatic    Cancer Mother         skin    Diabetes Mother      Social History     Tobacco Use    Smoking status: Every Day     Current packs/day: 0.00     Average packs/day: 1 pack/day for 40.0 years (40.0 ttl pk-yrs)     Types: Cigarettes     Start date: 1975     Last attempt to quit: 2015     Years since quittin.1     Passive exposure: Current    Smokeless tobacco: Never    Tobacco comments:     E-cig rarely   Substance Use Topics    Alcohol use: Yes     Alcohol/week: 6.0 standard drinks of alcohol     Types: 6 Cans of beer per week     Comment: weekly       SYSTEM Check if Review is Normal Check if Physical Exam is Normal If not normal, please explain:   HEENT [X ] [ X]    NECK  [X ] [ X]    HEART [X ] [ X]    LUNGS [X ] [ X]    ABDOMEN [X ] [ X]    EXTREMITIES [X ] [ X]    OTHER        [ x ] I have discussed the risks and benefits and alternatives with the patient/family.  They understand and agree to proceed with plan of care.  [ x ] I have reviewed the History and Physical done within the last 30 days.  Any changes noted above.    Sid Barraza MD  10/8/2024  8:51 AM

## 2024-10-08 NOTE — DISCHARGE INSTRUCTIONS
Home Care Instructions for Colonoscopy and/or Gastroscopy with Sedation    Diet:  - Resume your regular diet as tolerated unless otherwise instructed.  - Start with light meals to minimize bloating.  - Do not drink alcohol today.    Medication:  - If you have questions about resuming your normal medications, please contact your Primary Care Physician.    Activities:  - Take it easy today. Do not return to work today.  - Do not drive today.  - Do not operate any machinery today (including kitchen equipment).    Colonoscopy:  - You may notice some rectal \"spotting\" (a little blood on the toilet tissue) for a day or two after the exam. This is normal.  - If you experience any rectal bleeding (not spotting), persistent tenderness or sharp severe abdominal pains, oral temperature over 100 degrees Fahrenheit, light-headedness or dizziness, or any other problems, contact your doctor.    Gastroscopy:  - You may have a sore throat for 2-3 days following the exam. This is normal. Gargling with warm salt water (1/2 tsp salt to 1 glass warm water) or using throat lozenges will help.  - If you experience any sharp pain in your neck, abdomen or chest, vomiting of blood, oral temperature over 100 degrees Fahrenheit, light-headedness or dizziness, or any other problems, contact your doctor.    **If unable to reach your doctor, please go to the [facility] Emergency Room**    - Your referring physician will receive a full report of your examination.  - If you do not hear from your doctor's office within two weeks of your biopsy, please call them for your results.    You may be able to see your laboratory results in Buz between 4 and 7 business days.  In some cases, your physician may not have viewed the results before they are released to Buz.  If you have questions regarding your results contact the physician who ordered the test/exam by phone or via Buz by choosing \"Ask a Medical Question.\"

## 2024-10-08 NOTE — ANESTHESIA POSTPROCEDURE EVALUATION
Patient: Rodri Edwards    Procedure Summary       Date: 10/08/24 Room / Location: [facility] ENDOSCOPY 04 / [facility] ENDOSCOPY    Anesthesia Start: 0850 Anesthesia Stop:     Procedures:       COLONOSCOPY      ESOPHAGOGASTRODUODENOSCOPY (EGD) Diagnosis:       Hx of colonic polyps      Family history of malignant neoplasm of gastrointestinal tract      (colon polyps, diverticulosis, gastric ulcer, gastric polyp,)    Surgeons: Sid Barraza MD Anesthesiologist: Yuli Muse CRNA    Anesthesia Type: MAC ASA Status: 2            Anesthesia Type: MAC    Vitals Value Taken Time   /56 10/08/24 1007   Temp 98 °F (36.7 °C) 10/08/24 1007   Pulse 72 10/08/24 1007   Resp 20 10/08/24 [facility]   SpO2 99 % 10/08/24 [address] [place] [address] AN Post Evaluation:   Patient Evaluated in PACU  Patient Participation: complete - patient participated  Level of Consciousness: sleepy but conscious  Pain Score: 0  Pain Management: adequate  Airway Patency:patent  Dental exam unchanged from preop  Yes    Cardiovascular Status: stable and acceptable  Respiratory Status: acceptable and room air  Postoperative Hydration acceptable      YULI MUSE CRNA  10/8/2024 10:07 AM

## 2024-10-08 NOTE — ANESTHESIA PREPROCEDURE EVALUATION
Anesthesia PreOp Note    HPI:     Rodri Edwards is a 69 year old male who presents for preoperative consultation requested by: Sid Barraza MD    Date of Surgery: 10/8/2024    Procedure(s):  COLONOSCOPY  ESOPHAGOGASTRODUODENOSCOPY (EGD)  Indication: Hx of colonic polyps / Family history of malignant neoplasm of gastrointestinal tract    Relevant Problems   No relevant active problems       NPO:  Last Liquid Consumption Date: 10/08/24  Last Liquid Consumption Time: 0530  Last Solid Consumption Date: 10/07/24  Last Solid Consumption Time: 1400  Last Liquid Consumption Date: 10/08/24          History Review:  Patient Active Problem List    Diagnosis Date Noted    Essential hypertension 06/15/2023    Hypercholesterolemia 06/15/2023    Other atopic dermatitis 06/06/2022    History of colon cancer 08/28/2017    Chronic bilateral low back pain without sciatica 08/28/2017    History of pneumonia 08/28/2017    Chronic obstructive pulmonary disease (HCC) 09/18/2015    Pulmonary scarring 08/28/2015    Emphysema of lung (HCC) 08/28/2015    Hepatic lesion 08/28/2015    Tobacco use disorder 11/12/2012       Past Medical History:    Cancer (HCC)    colon cancer    Carcinoma in situ of colon    Disorder of liver    High blood pressure    High cholesterol    Hypercholesterolemia    Lung infection    Large mass S miliari    Measles    MRSA (methicillin resistant Staphylococcus aureus)    Per [employer] is JO ANN, double check with patient    Mumps    Pulmonary emphysema (HCC)    Varicella zoster       Past Surgical History:   Procedure Laterality Date    Bronchoscopy with brochial  09/09/2015    Colectomy  2000    Colonoscopy      Colonoscopy N/A 06/22/2021    Procedure: COLONOSCOPY with polypectomy;  Surgeon: Sid Barraza MD;  Location: [facility] ENDOSCOPY    Colonoscopy      Foot/toes surgery proc unlisted Left 2014    Left great toe infection       Medications Prior to Admission   Medication Sig Dispense Refill Last  Dose    gabapentin 100 MG Oral Cap Take 1 capsule (100 mg total) by mouth nightly. 30 capsule 0     atorvastatin 20 MG Oral Tab Take 1 tablet (20 mg total) by mouth nightly. 90 tablet 3 10/6/2024    lisinopril 20 MG Oral Tab Take 1 tablet (20 mg total) by mouth daily. 90 tablet 3 10/6/2024    Dupilumab (DUPIXENT) 300 MG/2ML Subcutaneous Solution Pen-injector Inject 1 Dose into the skin every 14 (fourteen) days. 3.92 mL 5 9/30/2024    ergocalciferol 1.25 MG (79648 UT) Oral Cap Take 1 capsule (50,000 Units total) by mouth once a week. 12 capsule 1 10/5/2024    ergocalciferol 1.25 MG (03143 UT) Oral Cap Take 1 capsule (50,000 Units total) by mouth once a week. 12 capsule 1     cyclobenzaprine 5 MG Oral Tab Take 1 tablet (5 mg total) by mouth nightly as needed for Muscle spasms. (Patient not taking: Reported on 10/5/2024) 15 tablet 0 Not Taking    predniSONE 5 MG Oral Tab Take 1 tablet (5 mg total) by mouth daily. (Patient not taking: Reported on 10/5/2024) 90 tablet 0 Not Taking    predniSONE 1 MG Oral Tab Take 4 tablets (4 mg total) by mouth daily with breakfast. As directed (Patient not taking: Reported on 10/5/2024) 360 tablet 1 Not Taking     Current Facility-Administered Medications Ordered in Epic   Medication Dose Route Frequency Provider Last Rate Last Admin    lactated ringers infusion   Intravenous Continuous Sid Barraza MD 20 mL/hr at 10/08/24 0752 New Bag at 10/08/24 0752     No current [facility]-ordered outpatient medications on file.       Allergies   Allergen Reactions    Latex RASH       Family History   Problem Relation Age of Onset    Cancer Father         pancreatic    Cancer Mother         skin    Diabetes Mother      Social History     Socioeconomic History    Marital status:    Tobacco Use    Smoking status: Every Day     Current packs/day: 0.00     Average packs/day: 1 pack/day for 40.0 years (40.0 ttl pk-yrs)     Types: Cigarettes     Start date: 8/31/1975     Last attempt to quit:  2015     Years since quittin.1     Passive exposure: Current    Smokeless tobacco: Never    Tobacco comments:     E-cig rarely   Vaping Use    Vaping status: Never Used   Substance and Sexual Activity    Alcohol use: Yes     Alcohol/week: 6.0 standard drinks of alcohol     Types: 6 Cans of beer per week     Comment: weekly    Drug use: No   Other Topics Concern    Caffeine Concern Yes     Comment: 6 cups coffee daily    Reaction to local anesthetic No    Pt has a pacemaker No    Pt has a defibrillator No       Available pre-op labs reviewed.             Vital Signs:  Body mass index is 25.1 kg/m².   height is 1.803 m (5' 11\") and weight is 81.6 kg (180 lb). His blood pressure is 127/89 and his pulse is 71. His respiration is 21 and oxygen saturation is 99%.   Vitals:    10/05/24 1342 10/08/24 0740   BP:  127/89   Pulse:  71   Resp:  21   SpO2:  99%   Weight: 81.6 kg (180 lb)    Height: 1.803 m (5' 11\")         Anesthesia Evaluation     Patient summary reviewed and Nursing notes reviewed    Airway   Mallampati: III  TM distance: >3 FB  Neck ROM: full  Dental - Dentition appears grossly intact     Pulmonary - normal exam   (+) COPD mild  Cardiovascular - normal exam  (+) hypertension well controlled    Neuro/Psych - negative ROS     GI/Hepatic/Renal - negative ROS     Endo/Other - negative ROS   Abdominal  - normal exam                 Anesthesia Plan:   ASA:  2  Plan:   MAC  Informed Consent Plan and Risks Discussed With:  Patient  Discussed plan with:  Surgeon      I have informed Rodri RUSSELL Edwards and/or legal guardian or family member of the nature of the anesthetic plan, benefits, risks including possible dental damage if relevant, major complications, and any alternative forms of anesthetic management.   All of the patient's questions were answered to the best of my ability. The patient desires the anesthetic management as planned.  HIMANSHU MUSE CRNA  10/8/2024 8:40 AM  Present on  Admission:  **None**

## 2024-10-14 DIAGNOSIS — L20.89 OTHER ATOPIC DERMATITIS: ICD-10-CM

## 2024-10-14 RX ORDER — DUPILUMAB 300 MG/2ML
1 INJECTION, SOLUTION SUBCUTANEOUS
Qty: 3.92 ML | Refills: 5 | Status: SHIPPED | OUTPATIENT
Start: 2024-10-14

## 2024-10-14 NOTE — TELEPHONE ENCOUNTER
Refill Request for medication(s):     Last Office Visit: 05/24/24    Last Refill: 05/24/24    Pharmacy, Dosage verified: yes    Condition Update (if applicable):     Rx pended and sent to provider for approval, please advise. Thank You!

## 2024-10-14 NOTE — TELEPHONE ENCOUNTER
Ok to refill. I do need one visit before the year is over so we have documentation he is doing well.

## 2024-10-24 ENCOUNTER — TELEPHONE (OUTPATIENT)
Facility: CLINIC | Age: 69
End: 2024-10-24

## 2024-10-24 NOTE — TELEPHONE ENCOUNTER
Health maintenance updated.     Last colonoscopy done 10/8/2024 by Dr Barraza    Recall placed into Pt Outreach, next due on 10/2027 per Dr Barraza.

## 2024-10-24 NOTE — TELEPHONE ENCOUNTER
Sid Barraza MD  10/22/2024  6:44 PM CDT Back to Top      I spoke to Terry.  He is feeling well.  He had up to #7 subcentimeter tubular adenomas removed.  I discussed the significance.  The gastric biopsies were negative for H. pylori or intestinal metaplasia.  Terry does take periodic NSAIDs.  He has a personal history of colon cancer, a personal history of multiple adenomatous colon polyps and a family history of cancer including pancreatic cancer.  I have recommended the followin.  Surveillance colonoscopy in no more than 3 years.  2.  Advised medical genetics evaluation.  Terry would like to think about it and contact us if he wishes to proceed.  I discussed the importance regarding recommendations for personal and family member screening/surveillance.  3.  Advise MRI of the pancreas in light of the family history of pancreatic cancer and other cancers.  He would also like to think about this as well and he will contact me if he wishes to proceed.  4.  Avoid NSAIDs.     GI RNs: Please enter colonoscopy recall for 3 years.

## 2024-12-12 ENCOUNTER — TELEPHONE (OUTPATIENT)
Dept: FAMILY MEDICINE CLINIC | Facility: CLINIC | Age: 69
End: 2024-12-12

## 2025-01-30 ENCOUNTER — OFFICE VISIT (OUTPATIENT)
Dept: FAMILY MEDICINE CLINIC | Facility: CLINIC | Age: 70
End: 2025-01-30

## 2025-01-30 VITALS
WEIGHT: 172.19 LBS | SYSTOLIC BLOOD PRESSURE: 112 MMHG | HEIGHT: 71 IN | BODY MASS INDEX: 24.1 KG/M2 | TEMPERATURE: 97 F | HEART RATE: 84 BPM | DIASTOLIC BLOOD PRESSURE: 79 MMHG

## 2025-01-30 DIAGNOSIS — M43.16 ANTEROLISTHESIS OF LUMBAR SPINE: Chronic | ICD-10-CM

## 2025-01-30 DIAGNOSIS — M47.816 ARTHRITIS, LUMBAR SPINE: ICD-10-CM

## 2025-01-30 DIAGNOSIS — M54.42 CHRONIC LEFT-SIDED LOW BACK PAIN WITH LEFT-SIDED SCIATICA: Primary | ICD-10-CM

## 2025-01-30 DIAGNOSIS — G89.29 CHRONIC LEFT-SIDED LOW BACK PAIN WITH LEFT-SIDED SCIATICA: Primary | ICD-10-CM

## 2025-01-30 PROCEDURE — 1159F MED LIST DOCD IN RCRD: CPT | Performed by: FAMILY MEDICINE

## 2025-01-30 PROCEDURE — G2211 COMPLEX E/M VISIT ADD ON: HCPCS | Performed by: FAMILY MEDICINE

## 2025-01-30 PROCEDURE — 99214 OFFICE O/P EST MOD 30 MIN: CPT | Performed by: FAMILY MEDICINE

## 2025-01-30 PROCEDURE — 3074F SYST BP LT 130 MM HG: CPT | Performed by: FAMILY MEDICINE

## 2025-01-30 PROCEDURE — 3078F DIAST BP <80 MM HG: CPT | Performed by: FAMILY MEDICINE

## 2025-01-30 PROCEDURE — 1125F AMNT PAIN NOTED PAIN PRSNT: CPT | Performed by: FAMILY MEDICINE

## 2025-01-30 PROCEDURE — 3008F BODY MASS INDEX DOCD: CPT | Performed by: FAMILY MEDICINE

## 2025-01-30 NOTE — PROGRESS NOTES
Patient ID: Rodri Edwards is a 69 year old male.    Back Pain  Pertinent negatives include no chest pain.     Chief Complaint   Patient presents with    Back Pain     Patient states he is having back pain that radiates down to leg     Narrative   PROCEDURE: XR LUMBAR SPINE (MIN 4 VIEWS) (CPT=72110)     COMPARISON: [facility], XR LUMBAR SPINE (MIN 4 VIEWS) (CPT=72110), 4/02/2021, 11:56 AM.  [facility], XR LUMBAR SPINE (MIN 2 VIEWS) (CPT=72100), 8/29/2017, 8:53 AM.     INDICATIONS: Lower back left sided back pain x2 years. No known injury.     TECHNIQUE: Lumbar spine radiographs (minimum 4 views)       FINDINGS:     ALIGNMENT:   Anterolisthesis of L4 on L5 measures 5 mm.  VERTEBRAL BODIES:   No acute fracture.  Osteopenia.  Small multilevel osteophytes.  DISC SPACES: Mild multilevel disc space narrowing.  SACROILIAC JOINTS: Mild to moderate degenerative joint disease.  OBLIQUE VIEWS: No pars defects.  Mild facet arthropathy.  OTHER: Presumed post cholecystectomy clips.  Vascular calcifications.  Bowel sutures.               Impression   CONCLUSION:     Anterolisthesis of L4 on L5 measuring 5 mm, similar to the prior exam.     No acute fracture.     Moderate spondylosis.           elm-remote           Dictated by (CST): Tong Orozco MD on 4/09/2024 at 1:54 PM      Finalized by (CST): Tong Orozco MD on 4/09/2024 at 2:00 PM       Last seen on 05/31/2022.      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  He lost weight voluntarily and does feel much better without the extra weight.    Pt c/o chronic lower back pain that radiates down the left hamstring to the knee. The leg pain began a few months ago. Pain worsens with walking. Candace Estes did a lumbar XR in March, 2024 and I discussed it with him. He was placed on a muscle relaxer which he did not think provided relief. Advil also has not provided relief. He has not completed physical therapy. I  discussed with him and referred him to physical therapy.  There is no sciatic symptoms down below the knee.  There is no loss of bowel or bladder control.      Wt Readings from Last 6 Encounters:   01/30/25 172 lb 3.2 oz   10/05/24 180 lb   06/27/24 189 lb   06/06/24 192 lb   04/09/24 200 lb 6.4 oz   06/15/23 203 lb 6.4 oz       BMI Readings from Last 6 Encounters:   01/30/25 24.02 kg/m²   10/05/24 25.10 kg/m²   06/27/24 27.12 kg/m²   06/06/24 27.55 kg/m²   04/09/24 28.75 kg/m²   06/15/23 28.69 kg/m²       BP Readings from Last 6 Encounters:   01/30/25 112/79   10/08/24 110/62   06/27/24 136/82   06/06/24 115/68   04/09/24 127/84   06/15/23 131/71         Review of Systems   Respiratory:  Negative for shortness of breath.    Cardiovascular:  Negative for chest pain.   Musculoskeletal:  Positive for back pain.           Medical History:      Past Medical History:    Cancer (HCC)    colon cancer    Carcinoma in situ of colon    Disorder of liver    High blood pressure    High cholesterol    Hypercholesterolemia    Lung infection    Large mass S miliari    Measles    MRSA (methicillin resistant Staphylococcus aureus)    Per [employer] deshawn CHERRY, double check with patient    Mumps    Pulmonary emphysema (HCC)    Varicella zoster       Past Surgical History:   Procedure Laterality Date    Bronchoscopy with brochial  09/09/2015    Colectomy  2000    Colonoscopy      Colonoscopy N/A 06/22/2021    Procedure: COLONOSCOPY with polypectomy;  Surgeon: Sid Barraza MD;  Location: [facility] ENDOSCOPY    Colonoscopy      Colonoscopy N/A 10/8/2024    Procedure: COLONOSCOPY;  Surgeon: Sid Barraza MD;  Location: [facility] ENDOSCOPY    Foot/toes surgery proc unlisted Left 2014    Left great toe infection          Current Outpatient Medications   Medication Sig Dispense Refill    ergocalciferol 1.25 MG (16380 UT) Oral Cap Take 1 capsule (50,000 Units total) by mouth once a week. 12 capsule 1    atorvastatin 20 MG Oral Tab Take 1  tablet (20 mg total) by mouth nightly. 90 tablet 3    lisinopril 20 MG Oral Tab Take 1 tablet (20 mg total) by mouth daily. 90 tablet 3    Dupilumab (DUPIXENT) 300 MG/2ML Subcutaneous Solution Pen-injector Inject 1 Dose into the skin every 14 (fourteen) days. (Patient not taking: Reported on 1/30/2025) 3.92 mL 5    gabapentin 100 MG Oral Cap Take 1 capsule (100 mg total) by mouth nightly. (Patient not taking: Reported on 1/30/2025) 30 capsule 0     Allergies:Allergies[1]     Physical Exam:       Physical Exam  Blood pressure 112/79, pulse 84, temperature 97.4 °F (36.3 °C), temperature source Tympanic, height 5' 11\" (1.803 m), weight 172 lb 3.2 oz.    Physical Exam   Constitutional: Patient is oriented to person, place, and time. Patient appears well-developed and well-nourished. No distress.   Neurological: Patient is alert and oriented to person, place, and time.   Skin: Skin is warm.   Psychiatry: Normal mood and affect.    --------------------------------------------------------------  BACK:     C/o lumbar pain across the lumbar back but no tenderness.   Deep Tendon Reflexes were 2 out of 4 bilateral lower extremity at knees and ankles.   Sensory Exam was intact  Good Strength with plantar flexion and dorsiflexion  Gait was normal.    Able to Flex Forward at the Waist and touch the floor   No bony tenderness.  FABERs Test is negative  Straight Leg Raise is negative bilaterally    ----------------------------------------------------------------     Vitals reviewed.           Assessment/Plan:      Diagnoses and all orders for this visit:    Chronic left-sided low back pain with left-sided sciatica  -     PHYSICAL THERAPY EXTERNAL  Start physical therapy as he is never done this before.  If you are still having discomfort after 4 to 5 weeks of physical therapy please see your PCP or myself.  Arthritis, lumbar spine  -     PHYSICAL THERAPY EXTERNAL  As above  Anterolisthesis of lumbar spine  -     PHYSICAL THERAPY  EXTERNAL  He was already aware of what this meant but I explained that as well .      Referrals (if applicable)  Orders Placed This Encounter   Procedures    PHYSICAL THERAPY EXTERNAL     Treatment: Evaluate & Treat, Include modalities if therapist feels they are needed  Physician goals: Pain relief, Increased Function, Activities of daily living and Education  Frequency: 2-3 times per week.........Duration: 4-6 weeks      Can take to various Physical Therapy locations as is APPROVED by your insurance.  You can go to places such as [facility] or [facility] physical therapy, which ever is closest to you and the facility should clear this with your insurance prior.  MY FAX # : [phone] for therapist to send me notes     Referral Priority:   Routine     Referral Type:   Rehab Services     Requested Specialty:   Physical Therapy     Number of Visits Requested:   9       Follow up if symptoms persist.  Take medicine (if given) as prescribed.  Approach to treatment discussed and patient/family member understands and agrees to plan.     No follow-ups on file.    There are no Patient Instructions on file for this visit.    Mamie Howard    1/30/2025    By signing my name below, IMamie,  attest that this documentation has been prepared under the direction and in the presence of Arden Mcadams DO.   Electronically Signed: Mamie Howard, 1/30/2025, 11:23 AM.      I, Arden Mcadams DO,  personally performed the services described in this documentation. All medical record entries made by the scribe were at my direction and in my presence.  I have reviewed the chart and discharge instructions (if applicable) and agree that the record reflects my personal performance and is accurate and complete.  Arden Mcadams DO, 1/30/2025, 12:00 PM                [1]   Allergies  Allergen Reactions    Latex RASH

## 2025-02-25 ENCOUNTER — LAB ENCOUNTER (OUTPATIENT)
Dept: LAB | Age: 70
End: 2025-02-25
Attending: FAMILY MEDICINE
Payer: MEDICARE

## 2025-02-25 ENCOUNTER — OFFICE VISIT (OUTPATIENT)
Dept: FAMILY MEDICINE CLINIC | Facility: CLINIC | Age: 70
End: 2025-02-25

## 2025-02-25 VITALS
HEIGHT: 71 IN | BODY MASS INDEX: 24.36 KG/M2 | WEIGHT: 174 LBS | DIASTOLIC BLOOD PRESSURE: 65 MMHG | SYSTOLIC BLOOD PRESSURE: 103 MMHG | OXYGEN SATURATION: 98 % | HEART RATE: 81 BPM

## 2025-02-25 DIAGNOSIS — I10 ESSENTIAL HYPERTENSION: ICD-10-CM

## 2025-02-25 DIAGNOSIS — L20.89 OTHER ATOPIC DERMATITIS: ICD-10-CM

## 2025-02-25 DIAGNOSIS — M54.50 CHRONIC BILATERAL LOW BACK PAIN WITHOUT SCIATICA: ICD-10-CM

## 2025-02-25 DIAGNOSIS — E55.9 VITAMIN D DEFICIENCY: ICD-10-CM

## 2025-02-25 DIAGNOSIS — K76.9 HEPATIC LESION: ICD-10-CM

## 2025-02-25 DIAGNOSIS — E78.00 HYPERCHOLESTEROLEMIA: ICD-10-CM

## 2025-02-25 DIAGNOSIS — Z00.00 ENCOUNTER FOR ANNUAL HEALTH EXAMINATION: ICD-10-CM

## 2025-02-25 DIAGNOSIS — J43.9 PULMONARY EMPHYSEMA, UNSPECIFIED EMPHYSEMA TYPE (HCC): ICD-10-CM

## 2025-02-25 DIAGNOSIS — J43.2 CENTRILOBULAR EMPHYSEMA (HCC): Primary | ICD-10-CM

## 2025-02-25 DIAGNOSIS — Z87.891 PERSONAL HISTORY OF NICOTINE DEPENDENCE: ICD-10-CM

## 2025-02-25 DIAGNOSIS — Z12.5 PROSTATE CANCER SCREENING: ICD-10-CM

## 2025-02-25 DIAGNOSIS — G89.29 CHRONIC BILATERAL LOW BACK PAIN WITHOUT SCIATICA: ICD-10-CM

## 2025-02-25 DIAGNOSIS — F17.200 TOBACCO USE DISORDER: ICD-10-CM

## 2025-02-25 LAB
ALBUMIN SERPL-MCNC: 4.5 G/DL (ref 3.2–4.8)
ALBUMIN/GLOB SERPL: 1.9 {RATIO} (ref 1–2)
ALP LIVER SERPL-CCNC: 60 U/L
ALT SERPL-CCNC: 20 U/L
ANION GAP SERPL CALC-SCNC: 6 MMOL/L (ref 0–18)
AST SERPL-CCNC: 22 U/L (ref ?–34)
BASOPHILS # BLD AUTO: 0.04 X10(3) UL (ref 0–0.2)
BASOPHILS NFR BLD AUTO: 0.5 %
BILIRUB SERPL-MCNC: 0.8 MG/DL (ref 0.2–1.1)
BILIRUB UR QL: NEGATIVE
BUN BLD-MCNC: 12 MG/DL (ref 9–23)
BUN/CREAT SERPL: 12.1 (ref 10–20)
CALCIUM BLD-MCNC: 9 MG/DL (ref 8.7–10.4)
CHLORIDE SERPL-SCNC: 108 MMOL/L (ref 98–112)
CHOLEST SERPL-MCNC: 132 MG/DL (ref ?–200)
CLARITY UR: CLEAR
CO2 SERPL-SCNC: 26 MMOL/L (ref 21–32)
COMPLEXED PSA SERPL-MCNC: 1.39 NG/ML (ref ?–4)
CREAT BLD-MCNC: 0.99 MG/DL
DEPRECATED RDW RBC AUTO: 45.8 FL (ref 35.1–46.3)
EGFRCR SERPLBLD CKD-EPI 2021: 81 ML/MIN/1.73M2 (ref 60–?)
EOSINOPHIL # BLD AUTO: 0.17 X10(3) UL (ref 0–0.7)
EOSINOPHIL NFR BLD AUTO: 2 %
ERYTHROCYTE [DISTWIDTH] IN BLOOD BY AUTOMATED COUNT: 13.2 % (ref 11–15)
FASTING PATIENT LIPID ANSWER: YES
FASTING STATUS PATIENT QL REPORTED: YES
GLOBULIN PLAS-MCNC: 2.4 G/DL (ref 2–3.5)
GLUCOSE BLD-MCNC: 89 MG/DL (ref 70–99)
GLUCOSE UR-MCNC: NORMAL MG/DL
HCT VFR BLD AUTO: 42.3 %
HDLC SERPL-MCNC: 54 MG/DL (ref 40–59)
HGB BLD-MCNC: 14.4 G/DL
HGB UR QL STRIP.AUTO: NEGATIVE
IMM GRANULOCYTES # BLD AUTO: 0.03 X10(3) UL (ref 0–1)
IMM GRANULOCYTES NFR BLD: 0.4 %
KETONES UR-MCNC: NEGATIVE MG/DL
LDLC SERPL CALC-MCNC: 61 MG/DL (ref ?–100)
LEUKOCYTE ESTERASE UR QL STRIP.AUTO: NEGATIVE
LYMPHOCYTES # BLD AUTO: 2.24 X10(3) UL (ref 1–4)
LYMPHOCYTES NFR BLD AUTO: 26.9 %
MCH RBC QN AUTO: 32.2 PG (ref 26–34)
MCHC RBC AUTO-ENTMCNC: 34 G/DL (ref 31–37)
MCV RBC AUTO: 94.6 FL
MONOCYTES # BLD AUTO: 0.81 X10(3) UL (ref 0.1–1)
MONOCYTES NFR BLD AUTO: 9.7 %
NEUTROPHILS # BLD AUTO: 5.05 X10 (3) UL (ref 1.5–7.7)
NEUTROPHILS # BLD AUTO: 5.05 X10(3) UL (ref 1.5–7.7)
NEUTROPHILS NFR BLD AUTO: 60.5 %
NITRITE UR QL STRIP.AUTO: NEGATIVE
NONHDLC SERPL-MCNC: 78 MG/DL (ref ?–130)
OSMOLALITY SERPL CALC.SUM OF ELEC: 289 MOSM/KG (ref 275–295)
PH UR: 5.5 [PH] (ref 5–8)
PLATELET # BLD AUTO: 208 10(3)UL (ref 150–450)
POTASSIUM SERPL-SCNC: 4.7 MMOL/L (ref 3.5–5.1)
PROT SERPL-MCNC: 6.9 G/DL (ref 5.7–8.2)
PROT UR-MCNC: NEGATIVE MG/DL
RBC # BLD AUTO: 4.47 X10(6)UL
SODIUM SERPL-SCNC: 140 MMOL/L (ref 136–145)
SP GR UR STRIP: 1.02 (ref 1–1.03)
TRIGL SERPL-MCNC: 89 MG/DL (ref 30–149)
UROBILINOGEN UR STRIP-ACNC: NORMAL
VIT D+METAB SERPL-MCNC: 76.7 NG/ML (ref 30–100)
VLDLC SERPL CALC-MCNC: 13 MG/DL (ref 0–30)
WBC # BLD AUTO: 8.3 X10(3) UL (ref 4–11)

## 2025-02-25 PROCEDURE — 85025 COMPLETE CBC W/AUTO DIFF WBC: CPT

## 2025-02-25 PROCEDURE — 80061 LIPID PANEL: CPT

## 2025-02-25 PROCEDURE — 80053 COMPREHEN METABOLIC PANEL: CPT

## 2025-02-25 PROCEDURE — 36415 COLL VENOUS BLD VENIPUNCTURE: CPT

## 2025-02-25 PROCEDURE — 82306 VITAMIN D 25 HYDROXY: CPT

## 2025-02-25 PROCEDURE — 81003 URINALYSIS AUTO W/O SCOPE: CPT

## 2025-02-25 NOTE — PROGRESS NOTES
Subjective:   Rodri Edwards is a 70 year old male who presents for a MA AHA (Medicare Advantage Annual Health Assessment) and Subsequent Annual Wellness visit (Pt already had Initial Annual Wellness) and scheduled follow up of multiple significant but stable problems.   History of Present Illness        History/Other:   Fall Risk Assessment:   He has been screened for Falls and is low risk.      Cognitive Assessment:   He had a completely normal cognitive assessment - see flowsheet entries     Functional Ability/Status:   Rodri Edwards has a completely normal functional assessment. See flowsheet for details.        Depression Screening (PHQ):  PHQ-2 SCORE: 0  , done 2/25/2025             Advanced Directives:   He does have a Living Will but we do NOT have it on file in Epic.    He does have a POA but we do NOT have it on file in Epic.    Discussed Advance Care Planning with patient (and family/surrogate if present). Standard forms made available to patient in After Visit Summary.      Patient Active Problem List   Diagnosis    Tobacco use disorder    Emphysema of lung (HCC)    Hepatic lesion    Chronic obstructive pulmonary disease (HCC)    History of colon cancer    Chronic bilateral low back pain without sciatica    History of pneumonia    Other atopic dermatitis    Essential hypertension    Hypercholesterolemia    Vitamin D deficiency     Allergies:  He is allergic to latex.    Current Medications:  Outpatient Medications Marked as Taking for the 2/25/25 encounter (Office Visit) with Mustapha Banks,    Medication Sig    atorvastatin 20 MG Oral Tab Take 1 tablet (20 mg total) by mouth nightly.    lisinopril 20 MG Oral Tab Take 1 tablet (20 mg total) by mouth daily.       Medical History:  He  has a past medical history of Cancer (HCC), Carcinoma in situ of colon, Disorder of liver, High blood pressure, High cholesterol, Hypercholesterolemia, Lung infection (2015), Measles, MRSA (methicillin resistant  Staphylococcus aureus) (2009), Mumps, Pulmonary emphysema (HCC), and Varicella zoster.  Surgical History:  He  has a past surgical history that includes foot/toes surgery proc unlisted (Left, ); colonoscopy; bronchoscopy with brochial (2015); Colectomy (); colonoscopy (N/A, 2021); colonoscopy; and colonoscopy (N/A, 10/8/2024).   Family History:  His family history includes Cancer in his father and mother; Diabetes in his mother.  Social History:  He  reports that he has been smoking cigarettes. He started smoking about 49 years ago. He has a 40 pack-year smoking history. He has been exposed to tobacco smoke. He has never used smokeless tobacco. He reports current alcohol use of about 6.0 standard drinks of alcohol per week. He reports that he does not use drugs.    Tobacco:  Social History     Tobacco Use   Smoking Status Every Day    Current packs/day: 0.00    Average packs/day: 1 pack/day for 40.0 years (40.0 ttl pk-yrs)    Types: Cigarettes    Start date: 1975    Last attempt to quit: 2015    Years since quittin.4    Passive exposure: Current   Smokeless Tobacco Never   Tobacco Comments    E-cig rarely     E-Cigarettes/Vaping       Questions Responses    E-Cigarette Use Never User          E-Cigarette/Vaping Substances       Questions Responses    Nicotine No    THC No    CBD No    Flavoring No          E-Cigarette/Vaping Devices       Questions Responses    Disposable No    Pre-filled or Refillable Cartridge No    Refillable Tank No    Pre-filled Pod No           Tobacco cessation counseling for 3-10 minutes (add E/M code #24952).      CAGE Alcohol Screen:   CAGE screening score of 0 on 2025, showing low risk of alcohol abuse.      Patient Care Team:  Mustapha Banks DO as PCP - General (Family Medicine)    Review of Systems   Constitutional: Negative.    HENT: Negative.     Eyes: Negative.    Respiratory: Negative.     Cardiovascular: Negative.    Gastrointestinal:  Negative.    Endocrine: Negative for polydipsia, polyphagia and polyuria.   Genitourinary: Negative.    Musculoskeletal:  Positive for back pain and myalgias.   Skin: Negative.         No mole changes   Allergic/Immunologic: Negative for environmental allergies.   Neurological:  Negative for dizziness, weakness, numbness and headaches.   Hematological: Negative.    Psychiatric/Behavioral:  Negative for sleep disturbance.         No anxiety or depressed feelings          Objective:   Physical Exam  Vitals reviewed.   Constitutional:       Appearance: Normal appearance. He is well-developed.   HENT:      Head: Normocephalic.      Right Ear: Tympanic membrane, ear canal and external ear normal.      Left Ear: Tympanic membrane, ear canal and external ear normal.      Nose: Nose normal.   Eyes:      General: Lids are normal.      Conjunctiva/sclera: Conjunctivae normal.      Pupils: Pupils are equal, round, and reactive to light.      Funduscopic exam:     Right eye: No hemorrhage or papilledema.         Left eye: No hemorrhage or papilledema.   Neck:      Vascular: Normal carotid pulses. No JVD.      Trachea: Trachea normal.   Cardiovascular:      Rate and Rhythm: Regular rhythm.      Pulses:           Carotid pulses are 2+ on the right side and 2+ on the left side.       Radial pulses are 2+ on the right side and 2+ on the left side.      Heart sounds: Normal heart sounds.   Pulmonary:      Breath sounds: Normal breath sounds.   Abdominal:      Tenderness: There is no abdominal tenderness.   Musculoskeletal:      Cervical back: Normal, normal range of motion and neck supple.      Thoracic back: Normal.      Lumbar back: Normal.   Lymphadenopathy:      Cervical: No cervical adenopathy.   Skin:     Comments: No suspicious lesions waist up exam   Neurological:      General: No focal deficit present.      Mental Status: He is alert and oriented to person, place, and time.      Sensory: No sensory deficit.      Deep Tendon  Reflexes: Reflexes are normal and symmetric.   Psychiatric:         Mood and Affect: Mood normal. Mood is not anxious or depressed.          /65   Pulse 81   Ht 5' 11\" (1.803 m)   Wt 174 lb (78.9 kg)   SpO2 98%   BMI 24.27 kg/m²  Estimated body mass index is 24.27 kg/m² as calculated from the following:    Height as of this encounter: 5' 11\" (1.803 m).    Weight as of this encounter: 174 lb (78.9 kg).    Medicare Hearing Assessment:   Hearing Screening    Screening Method: Questionnaire  I have a problem hearing over the telephone: No I have trouble following the conversations when two or more people are talking at the same time: No   I have trouble understanding things on the TV: No I have to strain to understand conversations: No   I have to worry about missing the telephone ring or doorbell: No I have trouble hearing conversations in a noisy background such as a crowded room or restaurant: Sometimes   I get confused about where sounds come from: No I misunderstand some words in a sentence and need to ask people to repeat themselves: No   I especially have trouble understanding the speech of women and children: No I have trouble understanding the speaker in a large room such as at a meeting or place of [employer]: No   Many people I talk to seem to mumble (or don't speak clearly): No People get annoyed because I misunderstand what they say: No   I misunderstand what others are saying and make inappropriate responses: No I avoid social activities because I cannot hear well and fear I will reply improperly: No   Family members and friends have told me they think I may have hearing loss: No                   Assessment & Plan:   Rodri Edwards is a 70 year old male who presents for a Medicare Assessment.     1. Centrilobular emphysema (HCC) (Primary)  Stable at this time secondary to smoking.    2. Chronic bilateral low back pain without sciatica  Intermittent depending on the type of work he is doing  self-limiting    3. Pulmonary emphysema, unspecified emphysema type (HCC)  Same as above    4. Essential hypertension  -     Urinalysis, Routine; Future; Expected date: 02/25/2025  Controlled continue present medicine    5. Hepatic lesion  Incidental finding on radiology exam.  Benign appearing.    6. Hypercholesterolemia  -     CBC With Differential With Platelet; Future; Expected date: 02/25/2025  -     Comp Metabolic Panel (14); Future; Expected date: 02/25/2025  -     Lipid Panel; Future; Expected date: 02/25/2025  Laboratory testing continue present medicine    7. Other atopic dermatitis  Mostly resolved.    8. Tobacco use disorder  -     CT LUNG LD SCREENING(CPT=71271); Future; Expected date: 02/25/2025  Encouraged and offer attempts to quit.  Not willing at this time CT lung scan ordered    9. Vitamin D deficiency  -     Vitamin D; Future; Expected date: 02/25/2025  Was taking vitamin D supplementation we will retest    10. Personal history of nicotine dependence  -     CT LUNG LD SCREENING(CPT=71271); Future; Expected date: 02/25/2025  Encouraged to quit offered different opportunities    11. Prostate cancer screening  -     PSA Total, Screen; Future; Expected date: 02/25/2025    12. Encounter for annual health examination  Other orders  -     Prevnar 20 (PCV20) [46076]  Assessment & Plan      The patient indicates understanding of these issues and agrees to the plan.  Lab work ordered.  Prescription medication ordered.  Reinforced healthy diet, lifestyle, and exercise.      No follow-ups on file.     Mustapha Banks DO, 2/25/2025     Supplementary Documentation:   General Health:  In the past six months, have you lost more than 10 pounds without trying?: 1 - Yes  Has your appetite been poor?: No  Type of Diet: Balanced;Low Salt;Low Carb  How does the patient maintain a good energy level?: Stretching  How would you describe your daily physical activity?: Moderate  How would you describe your current health  state?: Good  How do you maintain positive mental well-being?: Social Interaction;Puzzles;Games;Visiting Friends;Visiting Family  On a scale of 0 to 10, with 0 being no pain and 10 being severe pain, what is your pain level?: 4 - (Moderate)  In the past six months, have you experienced urine leakage?: 0-No  At any time do you feel concerned for the safety/well-being of yourself and/or your children, in your home or elsewhere?: No  Have you had any immunizations at another office such as Influenza, Hepatitis B, Tetanus, or Pneumococcal?: No    Health Maintenance   Topic Date Due    Zoster Vaccines (1 of 2) Never done    Lung Cancer Screening  08/17/2022    COVID-19 Vaccine (3 - 2024-25 season) 09/01/2024    Influenza Vaccine (1) 10/01/2024    Annual Well Visit  01/01/2025    Tobacco Cessation Counseling  01/01/2025    PSA  06/20/2025    Colorectal Cancer Screening  10/08/2027    Annual Depression Screening  Completed    Fall Risk Screening (Annual)  Completed    Pneumococcal Vaccine: 50+ Years  Completed    Meningococcal B Vaccine  Aged Out

## 2025-03-04 RX ORDER — ERGOCALCIFEROL 1.25 MG/1
50000 CAPSULE, LIQUID FILLED ORAL WEEKLY
Qty: 12 CAPSULE | Refills: 1 | OUTPATIENT
Start: 2025-03-04

## 2025-03-04 NOTE — TELEPHONE ENCOUNTER
Last vit d was normal 2/25. He no longer needs to take this weekly.  No additional prescription vit d needed at this time.

## 2025-03-04 NOTE — TELEPHONE ENCOUNTER
Refill Request for medication(s):     Last Office Visit: 05/24/24    Last Refill: 07/09/2024    Pharmacy, Dosage verified: yes        Component  Ref Range & Units 2/25/25  2:48 PM   Vitamin D, 25OH, Total  30.0 - 100.0 ng/mL 76.7   Comment: Literature Recommendations for 25(OH)D levels are:
